# Patient Record
Sex: FEMALE | Race: WHITE | NOT HISPANIC OR LATINO | Employment: UNEMPLOYED | URBAN - METROPOLITAN AREA
[De-identification: names, ages, dates, MRNs, and addresses within clinical notes are randomized per-mention and may not be internally consistent; named-entity substitution may affect disease eponyms.]

---

## 2024-05-10 ENCOUNTER — HOSPITAL ENCOUNTER (INPATIENT)
Facility: HOSPITAL | Age: 74
LOS: 3 days | Discharge: HOME HEALTH | DRG: 271 | End: 2024-05-13
Attending: EMERGENCY MEDICINE | Admitting: INTERNAL MEDICINE
Payer: MEDICARE

## 2024-05-10 DIAGNOSIS — I73.9 CLAUDICATION OF BOTH LOWER EXTREMITIES: ICD-10-CM

## 2024-05-10 DIAGNOSIS — Z87.891 HISTORY OF SMOKING: ICD-10-CM

## 2024-05-10 DIAGNOSIS — I73.9 PERIPHERAL ARTERIAL DISEASE: Primary | ICD-10-CM

## 2024-05-10 DIAGNOSIS — I70.222 CRITICAL LIMB ISCHEMIA OF LEFT LOWER EXTREMITY: ICD-10-CM

## 2024-05-10 DIAGNOSIS — R07.9 CHEST PAIN: ICD-10-CM

## 2024-05-10 LAB
ALBUMIN SERPL-MCNC: 3 G/DL (ref 3.4–4.8)
ALBUMIN/GLOB SERPL: 1.4 RATIO (ref 1.1–2)
ALP SERPL-CCNC: 60 UNIT/L (ref 40–150)
ALT SERPL-CCNC: 13 UNIT/L (ref 0–55)
APTT PPP: 101.4 SECONDS (ref 23.2–33.7)
AST SERPL-CCNC: 18 UNIT/L (ref 5–34)
BASOPHILS # BLD AUTO: 0.02 X10(3)/MCL
BASOPHILS NFR BLD AUTO: 0.2 %
BILIRUB SERPL-MCNC: 0.4 MG/DL
BUN SERPL-MCNC: 18.6 MG/DL (ref 9.8–20.1)
CALCIUM SERPL-MCNC: 8.2 MG/DL (ref 8.4–10.2)
CHLORIDE SERPL-SCNC: 116 MMOL/L (ref 98–107)
CO2 SERPL-SCNC: 20 MMOL/L (ref 23–31)
CREAT SERPL-MCNC: 1.55 MG/DL (ref 0.55–1.02)
EOSINOPHIL # BLD AUTO: 0.12 X10(3)/MCL (ref 0–0.9)
EOSINOPHIL NFR BLD AUTO: 1.4 %
ERYTHROCYTE [DISTWIDTH] IN BLOOD BY AUTOMATED COUNT: 15.4 % (ref 11.5–17)
GFR SERPLBLD CREATININE-BSD FMLA CKD-EPI: 35 ML/MIN/1.73/M2
GLOBULIN SER-MCNC: 2.2 GM/DL (ref 2.4–3.5)
GLUCOSE SERPL-MCNC: 90 MG/DL (ref 82–115)
HCT VFR BLD AUTO: 31.3 % (ref 37–47)
HGB BLD-MCNC: 10.3 G/DL (ref 12–16)
IMM GRANULOCYTES # BLD AUTO: 0.06 X10(3)/MCL (ref 0–0.04)
IMM GRANULOCYTES NFR BLD AUTO: 0.7 %
INR PPP: 1.1
LYMPHOCYTES # BLD AUTO: 3.32 X10(3)/MCL (ref 0.6–4.6)
LYMPHOCYTES NFR BLD AUTO: 37.6 %
MCH RBC QN AUTO: 35.5 PG (ref 27–31)
MCHC RBC AUTO-ENTMCNC: 32.9 G/DL (ref 33–36)
MCV RBC AUTO: 107.9 FL (ref 80–94)
MONOCYTES # BLD AUTO: 0.56 X10(3)/MCL (ref 0.1–1.3)
MONOCYTES NFR BLD AUTO: 6.3 %
NEUTROPHILS # BLD AUTO: 4.74 X10(3)/MCL (ref 2.1–9.2)
NEUTROPHILS NFR BLD AUTO: 53.8 %
NRBC BLD AUTO-RTO: 0 %
PLATELET # BLD AUTO: 209 X10(3)/MCL (ref 130–400)
PMV BLD AUTO: 10.5 FL (ref 7.4–10.4)
POTASSIUM SERPL-SCNC: 3.8 MMOL/L (ref 3.5–5.1)
PROT SERPL-MCNC: 5.2 GM/DL (ref 5.8–7.6)
PROTHROMBIN TIME: 13.7 SECONDS (ref 12.5–14.5)
RBC # BLD AUTO: 2.9 X10(6)/MCL (ref 4.2–5.4)
SODIUM SERPL-SCNC: 144 MMOL/L (ref 136–145)
WBC # SPEC AUTO: 8.82 X10(3)/MCL (ref 4.5–11.5)

## 2024-05-10 PROCEDURE — 11000001 HC ACUTE MED/SURG PRIVATE ROOM

## 2024-05-10 PROCEDURE — 80053 COMPREHEN METABOLIC PANEL: CPT | Performed by: EMERGENCY MEDICINE

## 2024-05-10 PROCEDURE — 99285 EMERGENCY DEPT VISIT HI MDM: CPT | Mod: 25

## 2024-05-10 PROCEDURE — 96365 THER/PROPH/DIAG IV INF INIT: CPT

## 2024-05-10 PROCEDURE — 96366 THER/PROPH/DIAG IV INF ADDON: CPT

## 2024-05-10 PROCEDURE — 85025 COMPLETE CBC W/AUTO DIFF WBC: CPT | Performed by: EMERGENCY MEDICINE

## 2024-05-10 PROCEDURE — 63600175 PHARM REV CODE 636 W HCPCS: Performed by: EMERGENCY MEDICINE

## 2024-05-10 PROCEDURE — 85610 PROTHROMBIN TIME: CPT | Performed by: EMERGENCY MEDICINE

## 2024-05-10 PROCEDURE — 85730 THROMBOPLASTIN TIME PARTIAL: CPT | Performed by: EMERGENCY MEDICINE

## 2024-05-10 RX ORDER — SODIUM CHLORIDE 9 MG/ML
1000 INJECTION, SOLUTION INTRAVENOUS
Status: COMPLETED | OUTPATIENT
Start: 2024-05-10 | End: 2024-05-11

## 2024-05-10 RX ORDER — ONDANSETRON HYDROCHLORIDE 2 MG/ML
4 INJECTION, SOLUTION INTRAVENOUS EVERY 4 HOURS PRN
Status: DISCONTINUED | OUTPATIENT
Start: 2024-05-11 | End: 2024-05-13 | Stop reason: HOSPADM

## 2024-05-10 RX ORDER — HEPARIN SODIUM,PORCINE/D5W 25000/250
0-40 INTRAVENOUS SOLUTION INTRAVENOUS CONTINUOUS
Status: DISCONTINUED | OUTPATIENT
Start: 2024-05-10 | End: 2024-05-11

## 2024-05-10 RX ORDER — ALUMINUM HYDROXIDE, MAGNESIUM HYDROXIDE, AND SIMETHICONE 1200; 120; 1200 MG/30ML; MG/30ML; MG/30ML
30 SUSPENSION ORAL 4 TIMES DAILY PRN
Status: DISCONTINUED | OUTPATIENT
Start: 2024-05-11 | End: 2024-05-13 | Stop reason: HOSPADM

## 2024-05-10 RX ORDER — NALOXONE HCL 0.4 MG/ML
0.02 VIAL (ML) INJECTION
Status: DISCONTINUED | OUTPATIENT
Start: 2024-05-11 | End: 2024-05-13 | Stop reason: HOSPADM

## 2024-05-10 RX ORDER — PROCHLORPERAZINE EDISYLATE 5 MG/ML
5 INJECTION INTRAMUSCULAR; INTRAVENOUS EVERY 6 HOURS PRN
Status: DISCONTINUED | OUTPATIENT
Start: 2024-05-11 | End: 2024-05-13 | Stop reason: HOSPADM

## 2024-05-10 RX ORDER — POLYETHYLENE GLYCOL 3350 17 G/17G
17 POWDER, FOR SOLUTION ORAL 2 TIMES DAILY PRN
Status: DISCONTINUED | OUTPATIENT
Start: 2024-05-11 | End: 2024-05-13 | Stop reason: HOSPADM

## 2024-05-10 RX ORDER — TALC
6 POWDER (GRAM) TOPICAL NIGHTLY PRN
Status: DISCONTINUED | OUTPATIENT
Start: 2024-05-11 | End: 2024-05-13 | Stop reason: HOSPADM

## 2024-05-10 RX ORDER — ACETAMINOPHEN 325 MG/1
650 TABLET ORAL EVERY 6 HOURS PRN
Status: DISCONTINUED | OUTPATIENT
Start: 2024-05-11 | End: 2024-05-13 | Stop reason: HOSPADM

## 2024-05-10 RX ORDER — SIMETHICONE 80 MG
1 TABLET,CHEWABLE ORAL 4 TIMES DAILY PRN
Status: DISCONTINUED | OUTPATIENT
Start: 2024-05-11 | End: 2024-05-13 | Stop reason: HOSPADM

## 2024-05-10 RX ADMIN — HEPARIN SODIUM 18 UNITS/KG/HR: 10000 INJECTION, SOLUTION INTRAVENOUS at 09:05

## 2024-05-10 NOTE — Clinical Note
The DP pulses were non-palpable bilaterally. The PT pulses were detected with doppler bilaterally. The radial pulses were +2 bilaterally.

## 2024-05-11 PROBLEM — I73.9 PERIPHERAL ARTERIAL DISEASE: Status: ACTIVE | Noted: 2024-05-11

## 2024-05-11 PROBLEM — I70.222 CRITICAL LIMB ISCHEMIA OF LEFT LOWER EXTREMITY: Status: ACTIVE | Noted: 2024-05-11

## 2024-05-11 LAB
ALBUMIN SERPL-MCNC: 2.8 G/DL (ref 3.4–4.8)
ALBUMIN/GLOB SERPL: 1.1 RATIO (ref 1.1–2)
ALP SERPL-CCNC: 61 UNIT/L (ref 40–150)
ALT SERPL-CCNC: 13 UNIT/L (ref 0–55)
APTT PPP: 64.8 SECONDS (ref 23.2–33.7)
AST SERPL-CCNC: 14 UNIT/L (ref 5–34)
BASOPHILS # BLD AUTO: 0.03 X10(3)/MCL
BASOPHILS NFR BLD AUTO: 0.4 %
BILIRUB SERPL-MCNC: 0.3 MG/DL
BUN SERPL-MCNC: 20.1 MG/DL (ref 9.8–20.1)
CALCIUM SERPL-MCNC: 8.3 MG/DL (ref 8.4–10.2)
CHLORIDE SERPL-SCNC: 116 MMOL/L (ref 98–107)
CO2 SERPL-SCNC: 21 MMOL/L (ref 23–31)
CREAT SERPL-MCNC: 1.51 MG/DL (ref 0.55–1.02)
EOSINOPHIL # BLD AUTO: 0.12 X10(3)/MCL (ref 0–0.9)
EOSINOPHIL NFR BLD AUTO: 1.5 %
ERYTHROCYTE [DISTWIDTH] IN BLOOD BY AUTOMATED COUNT: 15.4 % (ref 11.5–17)
GFR SERPLBLD CREATININE-BSD FMLA CKD-EPI: 36 ML/MIN/1.73/M2
GLOBULIN SER-MCNC: 2.6 GM/DL (ref 2.4–3.5)
GLUCOSE SERPL-MCNC: 132 MG/DL (ref 82–115)
HCT VFR BLD AUTO: 33.8 % (ref 37–47)
HGB BLD-MCNC: 11.1 G/DL (ref 12–16)
IMM GRANULOCYTES # BLD AUTO: 0.07 X10(3)/MCL (ref 0–0.04)
IMM GRANULOCYTES NFR BLD AUTO: 0.9 %
LYMPHOCYTES # BLD AUTO: 3.1 X10(3)/MCL (ref 0.6–4.6)
LYMPHOCYTES NFR BLD AUTO: 38.8 %
MAGNESIUM SERPL-MCNC: 1.7 MG/DL (ref 1.6–2.6)
MCH RBC QN AUTO: 35.5 PG (ref 27–31)
MCHC RBC AUTO-ENTMCNC: 32.8 G/DL (ref 33–36)
MCV RBC AUTO: 108 FL (ref 80–94)
MONOCYTES # BLD AUTO: 0.56 X10(3)/MCL (ref 0.1–1.3)
MONOCYTES NFR BLD AUTO: 7 %
NEUTROPHILS # BLD AUTO: 4.1 X10(3)/MCL (ref 2.1–9.2)
NEUTROPHILS NFR BLD AUTO: 51.4 %
NRBC BLD AUTO-RTO: 0 %
PHOSPHATE SERPL-MCNC: 3.5 MG/DL (ref 2.3–4.7)
PLATELET # BLD AUTO: 191 X10(3)/MCL (ref 130–400)
PMV BLD AUTO: 10.8 FL (ref 7.4–10.4)
POTASSIUM SERPL-SCNC: 4 MMOL/L (ref 3.5–5.1)
PROT SERPL-MCNC: 5.4 GM/DL (ref 5.8–7.6)
RBC # BLD AUTO: 3.13 X10(6)/MCL (ref 4.2–5.4)
SODIUM SERPL-SCNC: 145 MMOL/L (ref 136–145)
WBC # SPEC AUTO: 7.98 X10(3)/MCL (ref 4.5–11.5)

## 2024-05-11 PROCEDURE — C1714 CATH, TRANS ATHERECTOMY, DIR: HCPCS | Performed by: INTERNAL MEDICINE

## 2024-05-11 PROCEDURE — 21400001 HC TELEMETRY ROOM

## 2024-05-11 PROCEDURE — 80053 COMPREHEN METABOLIC PANEL: CPT | Performed by: NURSE PRACTITIONER

## 2024-05-11 PROCEDURE — 37252 INTRVASC US NONCORONARY 1ST: CPT | Performed by: INTERNAL MEDICINE

## 2024-05-11 PROCEDURE — 85025 COMPLETE CBC W/AUTO DIFF WBC: CPT | Performed by: EMERGENCY MEDICINE

## 2024-05-11 PROCEDURE — 04CL3ZZ EXTIRPATION OF MATTER FROM LEFT FEMORAL ARTERY, PERCUTANEOUS APPROACH: ICD-10-PCS | Performed by: INTERNAL MEDICINE

## 2024-05-11 PROCEDURE — B44GZZ3 ULTRASONOGRAPHY OF LEFT LOWER EXTREMITY ARTERIES, INTRAVASCULAR: ICD-10-PCS | Performed by: INTERNAL MEDICINE

## 2024-05-11 PROCEDURE — 27201423 OPTIME MED/SURG SUP & DEVICES STERILE SUPPLY: Performed by: INTERNAL MEDICINE

## 2024-05-11 PROCEDURE — 85730 THROMBOPLASTIN TIME PARTIAL: CPT | Performed by: EMERGENCY MEDICINE

## 2024-05-11 PROCEDURE — 84100 ASSAY OF PHOSPHORUS: CPT | Performed by: NURSE PRACTITIONER

## 2024-05-11 PROCEDURE — 37227 HC FEM/POPL REVASC STNT & ATHER: CPT | Mod: LT | Performed by: INTERNAL MEDICINE

## 2024-05-11 PROCEDURE — C1884 EMBOLIZATION PROTECT SYST: HCPCS | Performed by: INTERNAL MEDICINE

## 2024-05-11 PROCEDURE — 75716 ARTERY X-RAYS ARMS/LEGS: CPT | Mod: 59 | Performed by: INTERNAL MEDICINE

## 2024-05-11 PROCEDURE — C1769 GUIDE WIRE: HCPCS | Performed by: INTERNAL MEDICINE

## 2024-05-11 PROCEDURE — 99153 MOD SED SAME PHYS/QHP EA: CPT | Performed by: INTERNAL MEDICINE

## 2024-05-11 PROCEDURE — B41DZZZ FLUOROSCOPY OF AORTA AND BILATERAL LOWER EXTREMITY ARTERIES: ICD-10-PCS | Performed by: INTERNAL MEDICINE

## 2024-05-11 PROCEDURE — C1760 CLOSURE DEV, VASC: HCPCS | Performed by: INTERNAL MEDICINE

## 2024-05-11 PROCEDURE — 63600175 PHARM REV CODE 636 W HCPCS: Performed by: NURSE PRACTITIONER

## 2024-05-11 PROCEDURE — C1876 STENT, NON-COA/NON-COV W/DEL: HCPCS | Performed by: INTERNAL MEDICINE

## 2024-05-11 PROCEDURE — 25000003 PHARM REV CODE 250: Performed by: INTERNAL MEDICINE

## 2024-05-11 PROCEDURE — C1894 INTRO/SHEATH, NON-LASER: HCPCS | Performed by: INTERNAL MEDICINE

## 2024-05-11 PROCEDURE — 99152 MOD SED SAME PHYS/QHP 5/>YRS: CPT | Performed by: INTERNAL MEDICINE

## 2024-05-11 PROCEDURE — 25000003 PHARM REV CODE 250: Performed by: EMERGENCY MEDICINE

## 2024-05-11 PROCEDURE — 83735 ASSAY OF MAGNESIUM: CPT | Performed by: NURSE PRACTITIONER

## 2024-05-11 PROCEDURE — C1887 CATHETER, GUIDING: HCPCS | Performed by: INTERNAL MEDICINE

## 2024-05-11 PROCEDURE — C1725 CATH, TRANSLUMIN NON-LASER: HCPCS | Performed by: INTERNAL MEDICINE

## 2024-05-11 PROCEDURE — 63600175 PHARM REV CODE 636 W HCPCS: Performed by: INTERNAL MEDICINE

## 2024-05-11 PROCEDURE — 25000003 PHARM REV CODE 250: Performed by: NURSE PRACTITIONER

## 2024-05-11 PROCEDURE — 047L3D1 DILATION OF LEFT FEMORAL ARTERY WITH INTRALUMINAL DEVICE, USING DRUG-COATED BALLOON, PERCUTANEOUS APPROACH: ICD-10-PCS | Performed by: INTERNAL MEDICINE

## 2024-05-11 DEVICE — ANGIO-SEAL VIP VASCULAR CLOSURE DEVICE
Type: IMPLANTABLE DEVICE | Site: ARTERIAL | Status: FUNCTIONAL
Brand: ANGIO-SEAL

## 2024-05-11 DEVICE — SELF-EXPANDING STENT SYSTEM
Type: IMPLANTABLE DEVICE | Site: ARTERIAL | Status: FUNCTIONAL
Brand: INNOVA™ VASCULAR

## 2024-05-11 RX ORDER — CLOPIDOGREL BISULFATE 300 MG/1
300 TABLET, FILM COATED ORAL ONCE
Qty: 1 TABLET | Refills: 0 | Status: COMPLETED | OUTPATIENT
Start: 2024-05-11 | End: 2024-05-11

## 2024-05-11 RX ORDER — SODIUM CHLORIDE, SODIUM LACTATE, POTASSIUM CHLORIDE, CALCIUM CHLORIDE 600; 310; 30; 20 MG/100ML; MG/100ML; MG/100ML; MG/100ML
INJECTION, SOLUTION INTRAVENOUS CONTINUOUS
Status: DISCONTINUED | OUTPATIENT
Start: 2024-05-11 | End: 2024-05-13

## 2024-05-11 RX ORDER — LIDOCAINE HYDROCHLORIDE 10 MG/ML
INJECTION INFILTRATION; PERINEURAL
Status: DISCONTINUED | OUTPATIENT
Start: 2024-05-11 | End: 2024-05-11 | Stop reason: HOSPADM

## 2024-05-11 RX ORDER — CLOPIDOGREL BISULFATE 75 MG/1
75 TABLET ORAL DAILY
Status: DISCONTINUED | OUTPATIENT
Start: 2024-05-12 | End: 2024-05-13 | Stop reason: HOSPADM

## 2024-05-11 RX ORDER — HEPARIN SODIUM 1000 [USP'U]/ML
INJECTION, SOLUTION INTRAVENOUS; SUBCUTANEOUS
Status: DISCONTINUED | OUTPATIENT
Start: 2024-05-11 | End: 2024-05-11 | Stop reason: HOSPADM

## 2024-05-11 RX ORDER — HYDRALAZINE HYDROCHLORIDE 20 MG/ML
INJECTION INTRAMUSCULAR; INTRAVENOUS
Status: DISCONTINUED | OUTPATIENT
Start: 2024-05-11 | End: 2024-05-11 | Stop reason: HOSPADM

## 2024-05-11 RX ORDER — ASPIRIN 325 MG
325 TABLET, DELAYED RELEASE (ENTERIC COATED) ORAL ONCE
Status: COMPLETED | OUTPATIENT
Start: 2024-05-11 | End: 2024-05-11

## 2024-05-11 RX ORDER — MORPHINE SULFATE 4 MG/ML
2 INJECTION, SOLUTION INTRAMUSCULAR; INTRAVENOUS EVERY 4 HOURS PRN
Status: DISCONTINUED | OUTPATIENT
Start: 2024-05-11 | End: 2024-05-13 | Stop reason: HOSPADM

## 2024-05-11 RX ORDER — SODIUM CHLORIDE 9 MG/ML
INJECTION, SOLUTION INTRAVENOUS CONTINUOUS
Status: ACTIVE | OUTPATIENT
Start: 2024-05-11 | End: 2024-05-11

## 2024-05-11 RX ORDER — HYDROCODONE BITARTRATE AND ACETAMINOPHEN 5; 325 MG/1; MG/1
1 TABLET ORAL EVERY 4 HOURS PRN
Status: DISCONTINUED | OUTPATIENT
Start: 2024-05-11 | End: 2024-05-13 | Stop reason: HOSPADM

## 2024-05-11 RX ORDER — VANCOMYCIN HYDROCHLORIDE 1 G/20ML
INJECTION, POWDER, LYOPHILIZED, FOR SOLUTION INTRAVENOUS
Status: DISCONTINUED | OUTPATIENT
Start: 2024-05-11 | End: 2024-05-11 | Stop reason: HOSPADM

## 2024-05-11 RX ORDER — NITROGLYCERIN 20 MG/100ML
INJECTION INTRAVENOUS
Status: DISCONTINUED | OUTPATIENT
Start: 2024-05-11 | End: 2024-05-11 | Stop reason: HOSPADM

## 2024-05-11 RX ORDER — FENTANYL CITRATE 50 UG/ML
INJECTION, SOLUTION INTRAMUSCULAR; INTRAVENOUS
Status: DISCONTINUED | OUTPATIENT
Start: 2024-05-11 | End: 2024-05-11 | Stop reason: HOSPADM

## 2024-05-11 RX ORDER — HYDRALAZINE HYDROCHLORIDE 20 MG/ML
10 INJECTION INTRAMUSCULAR; INTRAVENOUS EVERY 4 HOURS PRN
Status: DISCONTINUED | OUTPATIENT
Start: 2024-05-11 | End: 2024-05-13 | Stop reason: HOSPADM

## 2024-05-11 RX ORDER — MIDAZOLAM HYDROCHLORIDE 1 MG/ML
INJECTION INTRAMUSCULAR; INTRAVENOUS
Status: DISCONTINUED | OUTPATIENT
Start: 2024-05-11 | End: 2024-05-11 | Stop reason: HOSPADM

## 2024-05-11 RX ORDER — ASPIRIN 81 MG/1
81 TABLET ORAL DAILY
Status: DISCONTINUED | OUTPATIENT
Start: 2024-05-12 | End: 2024-05-13 | Stop reason: HOSPADM

## 2024-05-11 RX ADMIN — CLOPIDOGREL BISULFATE 300 MG: 300 TABLET, FILM COATED ORAL at 11:05

## 2024-05-11 RX ADMIN — SODIUM CHLORIDE: 9 INJECTION, SOLUTION INTRAVENOUS at 06:05

## 2024-05-11 RX ADMIN — ASPIRIN 325 MG: 325 TABLET, COATED ORAL at 10:05

## 2024-05-11 RX ADMIN — HYDROCODONE BITARTRATE AND ACETAMINOPHEN 1 TABLET: 5; 325 TABLET ORAL at 03:05

## 2024-05-11 RX ADMIN — SODIUM CHLORIDE, POTASSIUM CHLORIDE, SODIUM LACTATE AND CALCIUM CHLORIDE: 600; 310; 30; 20 INJECTION, SOLUTION INTRAVENOUS at 05:05

## 2024-05-11 RX ADMIN — SODIUM CHLORIDE 1000 ML: 9 INJECTION, SOLUTION INTRAVENOUS at 12:05

## 2024-05-11 NOTE — CONSULTS
Inpatient consult to Cardiology  Consult performed by: Ananya Kelley FNP  Consult ordered by: Anthony Live MD        [facility] - [facility] Tele[facility]    Cardiology  Consult Note    Patient Name: Zita Read  MRN: [id number]  Admission Date: 5/10/2024  Hospital Length of Stay: 1 days  Code Status: Full Code   Attending Provider: James Black MD   Consulting Provider: LINDA Villeda  Primary Care Physician: Gilberto Flores III, MD  Principal Problem:Peripheral arterial disease    Patient information was obtained from patient, past medical records, and ER records.     Subjective:     Chief Complaint/Reason for Consult: PAD    HPI:   Ms. Read is a 74y/o female, unknown to [facility], who was transferred from [facility] for vascular services due to BLE pain L>R. Feet were cool with discolored toes. CTA abdomen/pelvis with runoff obtained revealing occlusion of most of Left femoral artery with reconstitution from profunda collateral vessels at the distal femoral artery into the popliteal artery with diffuse narrowing of remaining distal L femoral and popliteal arteries. 3V runoff to mid calf with suboptimal opacification of L AT artery and PT artery at the level of the ankle. RLE revealing contrast opacifiation of R AT, PT and peroneal arteries up to the mid calf with no opacification seen below that level.  She has been placed on a heparin drip and admitted to hospital medicine. [facility] consulted due to PAD  ** BLE have cool feet. + numbness/tingling with ischemic changes to left big toe and left pinkie. + monophasic doppler PT pulses BLE and DP pulse to R foot. No pulse noted to  L DP. Appears to be chronic limb ischemia         PMH: HLD, COPD, PTSD, sarcomatoid carcinoma left lung, left foot bone spur removal 02/2024  PSH: hysterectomy with BSO, cholecystectomy, vertebroplasty, colonoscopy/EGD with dilatation, L lung lobectomy  Family History: father- prostate cancer;  mother- HTN,Renal failure, DM2  Social History: current smoker    Previous Cardiac Diagnostics:   None on file    No past medical history on file.  No past surgical history on file.  Review of patient's allergies indicates:   Allergen Reactions    Levaquin [levofloxacin]     Lithium analogues     Pcn [penicillins]      No current facility-administered medications on file prior to encounter.     No current outpatient medications on file prior to encounter.     Family History    None       Tobacco Use    Smoking status: Not on file    Smokeless tobacco: Not on file   Substance and Sexual Activity    Alcohol use: Not on file    Drug use: Not on file    Sexual activity: Not on file       Review of Systems   Constitutional: Negative.    Respiratory: Negative.     Cardiovascular: Negative.    Gastrointestinal: Negative.    Musculoskeletal:         Numbness/tingling to bilateral feet, worse to left foot   Skin:  Positive for color change.       Objective:     Vital Signs (Most Recent):  Temp: 97.8 °F (36.6 °C) (05/11/24 0424)  Pulse: 71 (05/11/24 0424)  Resp: (!) 26 (05/10/24 2259)  BP: 129/71 (05/11/24 0424)  SpO2: 99 % (05/11/24 0424) Vital Signs (24h Range):  Temp:  [97.8 °F (36.6 °C)-98.2 °F (36.8 °C)] 97.8 °F (36.6 °C)  Pulse:  [55-71] 71  Resp:  [14-26] 26  SpO2:  [99 %-100 %] 99 %  BP: (129-155)/(67-83) 129/71   Weight: 63.9 kg (140 lb 14 oz)  Body mass index is 22.06 kg/m².  SpO2: 99 %     No intake or output data in the 24 hours ending 05/11/24 0756  Lines/Drains/Airways       Peripheral Intravenous Line  Duration                  Peripheral IV - Single Lumen 20 G Anterior;Proximal;Right Forearm -- days         Peripheral IV - Single Lumen 20 G Right Antecubital -- days                  Significant Labs:  Recent Results (from the past 72 hour(s))   Comprehensive metabolic panel    Collection Time: 05/10/24  9:52 PM   Result Value Ref Range    Sodium 144 136 - 145 mmol/L    Potassium 3.8 3.5 - 5.1 mmol/L     Chloride 116 (H) 98 - 107 mmol/L    CO2 20 (L) 23 - 31 mmol/L    Glucose 90 82 - 115 mg/dL    Blood Urea Nitrogen 18.6 9.8 - 20.1 mg/dL    Creatinine 1.55 (H) 0.55 - 1.02 mg/dL    Calcium 8.2 (L) 8.4 - 10.2 mg/dL    Protein Total 5.2 (L) 5.8 - 7.6 gm/dL    Albumin 3.0 (L) 3.4 - 4.8 g/dL    Globulin 2.2 (L) 2.4 - 3.5 gm/dL    Albumin/Globulin Ratio 1.4 1.1 - 2.0 ratio    Bilirubin Total 0.4 <=1.5 mg/dL    ALP 60 40 - 150 unit/L    ALT 13 0 - 55 unit/L    AST 18 5 - 34 unit/L    eGFR 35 mL/min/1.73/m2   APTT    Collection Time: 05/10/24  9:52 PM   Result Value Ref Range    .4 (H) 23.2 - 33.7 seconds   Protime-INR    Collection Time: 05/10/24  9:52 PM   Result Value Ref Range    PT 13.7 12.5 - 14.5 seconds    INR 1.1 <=1.3   CBC with Differential    Collection Time: 05/10/24  9:52 PM   Result Value Ref Range    WBC 8.82 4.50 - 11.50 x10(3)/mcL    RBC 2.90 (L) 4.20 - 5.40 x10(6)/mcL    Hgb 10.3 (L) 12.0 - 16.0 g/dL    Hct 31.3 (L) 37.0 - 47.0 %    .9 (H) 80.0 - 94.0 fL    MCH 35.5 (H) 27.0 - 31.0 pg    MCHC 32.9 (L) 33.0 - 36.0 g/dL    RDW 15.4 11.5 - 17.0 %    Platelet 209 130 - 400 x10(3)/mcL    MPV 10.5 (H) 7.4 - 10.4 fL    Neut % 53.8 %    Lymph % 37.6 %    Mono % 6.3 %    Eos % 1.4 %    Basophil % 0.2 %    Lymph # 3.32 0.6 - 4.6 x10(3)/mcL    Neut # 4.74 2.1 - 9.2 x10(3)/mcL    Mono # 0.56 0.1 - 1.3 x10(3)/mcL    Eos # 0.12 0 - 0.9 x10(3)/mcL    Baso # 0.02 <=0.2 x10(3)/mcL    IG# 0.06 (H) 0 - 0.04 x10(3)/mcL    IG% 0.7 %    NRBC% 0.0 %   APTT    Collection Time: 05/11/24  4:05 AM   Result Value Ref Range    PTT 64.8 (H) 23.2 - 33.7 seconds   Comprehensive Metabolic Panel (CMP)    Collection Time: 05/11/24  4:05 AM   Result Value Ref Range    Sodium 145 136 - 145 mmol/L    Potassium 4.0 3.5 - 5.1 mmol/L    Chloride 116 (H) 98 - 107 mmol/L    CO2 21 (L) 23 - 31 mmol/L    Glucose 132 (H) 82 - 115 mg/dL    Blood Urea Nitrogen 20.1 9.8 - 20.1 mg/dL    Creatinine 1.51 (H) 0.55 - 1.02 mg/dL    Calcium 8.3  (L) 8.4 - 10.2 mg/dL    Protein Total 5.4 (L) 5.8 - 7.6 gm/dL    Albumin 2.8 (L) 3.4 - 4.8 g/dL    Globulin 2.6 2.4 - 3.5 gm/dL    Albumin/Globulin Ratio 1.1 1.1 - 2.0 ratio    Bilirubin Total 0.3 <=1.5 mg/dL    ALP 61 40 - 150 unit/L    ALT 13 0 - 55 unit/L    AST 14 5 - 34 unit/L    eGFR 36 mL/min/1.73/m2   Magnesium    Collection Time: 05/11/24  4:05 AM   Result Value Ref Range    Magnesium Level 1.70 1.60 - 2.60 mg/dL   Phosphorus    Collection Time: 05/11/24  4:05 AM   Result Value Ref Range    Phosphorus Level 3.5 2.3 - 4.7 mg/dL   CBC with Differential    Collection Time: 05/11/24  4:05 AM   Result Value Ref Range    WBC 7.98 4.50 - 11.50 x10(3)/mcL    RBC 3.13 (L) 4.20 - 5.40 x10(6)/mcL    Hgb 11.1 (L) 12.0 - 16.0 g/dL    Hct 33.8 (L) 37.0 - 47.0 %    .0 (H) 80.0 - 94.0 fL    MCH 35.5 (H) 27.0 - 31.0 pg    MCHC 32.8 (L) 33.0 - 36.0 g/dL    RDW 15.4 11.5 - 17.0 %    Platelet 191 130 - 400 x10(3)/mcL    MPV 10.8 (H) 7.4 - 10.4 fL    Neut % 51.4 %    Lymph % 38.8 %    Mono % 7.0 %    Eos % 1.5 %    Basophil % 0.4 %    Lymph # 3.10 0.6 - 4.6 x10(3)/mcL    Neut # 4.10 2.1 - 9.2 x10(3)/mcL    Mono # 0.56 0.1 - 1.3 x10(3)/mcL    Eos # 0.12 0 - 0.9 x10(3)/mcL    Baso # 0.03 <=0.2 x10(3)/mcL    IG# 0.07 (H) 0 - 0.04 x10(3)/mcL    IG% 0.9 %    NRBC% 0.0 %     Significant Imaging:  Imaging Results    None       Physical Exam  Cardiovascular:      Rate and Rhythm: Normal rate and regular rhythm.      Pulses: Normal pulses.      Heart sounds: Normal heart sounds.   Pulmonary:      Effort: Pulmonary effort is normal.      Breath sounds: Normal breath sounds.   Abdominal:      Palpations: Abdomen is soft.   Musculoskeletal:         General: Normal range of motion.   Skin:     General: Skin is warm.      Comments: Cool feet bilaterally  Ischemic changes to left big toe and left pinkie  Scar to left proximal foot due to bone spur removal   Neurological:      General: No focal deficit present.      Mental Status: She  is alert.   Psychiatric:         Mood and Affect: Mood normal.           Home Medications:   No current facility-administered medications on file prior to encounter.     No current outpatient medications on file prior to encounter.     Current Inpatient Medications:    Current Facility-Administered Medications:     acetaminophen tablet 650 mg, 650 mg, Oral, Q6H PRN, Valeri Diana, AGACNP-BC    aluminum-magnesium hydroxide-simethicone 200-200-20 mg/5 mL suspension 30 mL, 30 mL, Oral, QID PRN, Valeri Diana, AGACNP-BC    heparin 25,000 units in dextrose 5% (100 units/ml) IV bolus from bag HIGH INTENSITY nomogram - [facility], 60 Units/kg (Adjusted), Intravenous, PRN, Anthony Live MD    heparin 25,000 units in dextrose 5% (100 units/ml) IV bolus from bag HIGH INTENSITY nomogram - [facility], 30 Units/kg (Adjusted), Intravenous, PRN, Anthony Live MD    heparin 25,000 units in dextrose 5% 250 mL (100 units/mL) infusion HIGH INTENSITY nomogram - [facility], 0-40 Units/kg/hr (Adjusted), Intravenous, Continuous, Anthony Live MD, Last Rate: 11.3 mL/hr at 05/10/24 2129, 18 Units/kg/hr at 05/10/24 2129    lactated ringers infusion, , Intravenous, Continuous, Valeri Diana, AGACNP-BC, Last Rate: 75 mL/hr at 05/11/24 0533, New Bag at 05/11/24 0533    melatonin tablet 6 mg, 6 mg, Oral, Nightly PRN, Valeri Diana, AGACNP-BC    naloxone 0.4 mg/mL injection 0.02 mg, 0.02 mg, Intravenous, PRN, Valeri Diana, AGACNP-BC    ondansetron injection 4 mg, 4 mg, Intravenous, Q4H PRN, Valeri Diana, AGACNP-BC    polyethylene glycol packet 17 g, 17 g, Oral, BID PRN, Valeri Diana, AGACNP-BC    prochlorperazine injection Soln 5 mg, 5 mg, Intravenous, Q6H PRN, Valeri Diana, AGACNP-BC    simethicone chewable tablet 80 mg, 1 tablet, Oral, QID PRN, Valeri Diana, LB-BC  VTE Risk Mitigation (From admission, onward)           Ordered     IP VTE HIGH RISK PATIENT  Once         05/10/24 2344     Place sequential  compression device  Until discontinued         05/10/24 2344     Reason for No Pharmacological VTE Prophylaxis  Once        Question:  Reasons:  Answer:  IV Heparin w/in 24 hrs. Pre or Post-Op    05/10/24 2344     heparin 25,000 units in dextrose 5% (100 units/ml) IV bolus from bag HIGH INTENSITY nomogram - [facility]  As needed (PRN)        Question:  Heparin Infusion Adjustment (DO NOT MODIFY ANSWER)  Answer:  [email or web address]\Images\Pharmacy\HeparinInfusions\heparin HIGH INTENSITY nomogram for [facility] [id number].pdf    05/10/24 2121     heparin 25,000 units in dextrose 5% (100 units/ml) IV bolus from bag HIGH INTENSITY nomogram - [facility]  As needed (PRN)        Question:  Heparin Infusion Adjustment (DO NOT MODIFY ANSWER)  Answer:  [email or web address]\Images\Pharmacy\HeparinInfusions\heparin HIGH INTENSITY nomogram for [facility] [id number].pdf    05/10/24 2121     heparin 25,000 units in dextrose 5% 250 mL (100 units/mL) infusion HIGH INTENSITY nomogram - [facility]  Continuous        Question:  Begin at (units/kg/hr)  Answer:  18    05/10/24 2121                  Assessment:   CLI  -CTA abdomen/pelvis and BLE 5.10.24: occlusion of most of Left femoral artery with reconstitution from profunda collateral vessels at the distal femoral artery into the popliteal artery with diffuse narrowing of remaining distal L femoral and popliteal arteries. 3V runoff to mid calf with suboptimal opacification of L AT artery and PT artery at the level of the ankle. RLE revealing contrast opacifiation of R AT, PT and peroneal arteries up to the mid calf with no opacification seen below that level.   COPD  HLD  PTSD  DORIAN  Hx sarcomatoid carcinoma of left long  Tobacco dependence  Hx bone spur removal left foot    Plan:   DC heparin drip  Plan peripheral angiogram LLE today. R/B/A discussed with patient and she is amenable to proceeding. Consent obtained and placed on chart  Start aspirin, plavix, and statin  Pain management per primary  Creatinine 1.5. IVF  infusing      Thank you for your consult.     Ananya Kelley, LINDA  Cardiology  [facility] - [facility] Tele[facility]  05/11/2024     I agree with the findings of the complexity of problems addressed and take responsibility for the plan's risks and complications. I approved the plan documented by Ananya Kelley NP. Dr. Giraldo to do angio case discussed

## 2024-05-11 NOTE — ED PROVIDER NOTES
Encounter Date: 5/10/2024       History     Chief Complaint   Patient presents with    Transfer     Transfer from [facility] for vascular sx w/ complete occlusion of left femoral artery     Patient is a 73-year-old female who was transferred from a outlying facility secondary to peripheral arterial disease.  CTA was done of the lower extremities which apparently showed a complete occlusion of the left femoral artery with diffuse narrowing and partial occlusion of the right popliteal artery.  Patient is a smoker.  Patient denies any coronary artery disease.  Patient was given a heparin bolus and started on a heparin infusion prior to transfer.      Labs done transferring facility, white blood cell count 9400, H&H 10.9 and 33.7, platelet count 345265, sodium 145, potassium 3.5, BUN 23, creatinine 1.82.  Glucose was 86, LFTs are within normal.  PTT 11.2, INR 1.0, APTT 23 seconds    CTA of the abdomen pelvis and bilateral lower extremities with IV contrast was performed at the transferring facility:  Lower extremity vasculature, the right common femoral and femoral arteries appear patent.  Portions of the right popliteal artery are obscured by right knee arthroplasty.  There is a contrast opacification of the proximal right anterior tibial, posterior tibial and peroneal arteries.  These are not seen past the mid calf region concerning for occlusion seen on imaging      Review of patient's allergies indicates:   Allergen Reactions    Levaquin [levofloxacin]     Lithium analogues     Pcn [penicillins]      No past medical history on file.  No past surgical history on file.  No family history on file.     Review of Systems   Constitutional: Negative.    Respiratory: Negative.     Cardiovascular: Negative.    Gastrointestinal: Negative.    Musculoskeletal:  Positive for myalgias. Negative for back pain, joint swelling and neck pain.   Skin:  Positive for color change.   Neurological: Negative.     Psychiatric/Behavioral: Negative.         Physical Exam     Initial Vitals [05/10/24 2049]   BP Pulse Resp Temp SpO2   136/67 62 14 98.2 °F (36.8 °C) 99 %      MAP       --         Physical Exam    Nursing note and vitals reviewed.  Constitutional: She appears well-developed and well-nourished.   HENT:   Head: Normocephalic.   Neck: Neck supple.   Normal range of motion.  Cardiovascular:  Normal rate, regular rhythm and normal heart sounds.           Pulmonary/Chest: Breath sounds normal.   Abdominal: Abdomen is soft. She exhibits no distension. There is no abdominal tenderness. There is no guarding.   Musculoskeletal:      Cervical back: Normal range of motion and neck supple.      Comments: Patient can move both feet without difficulty and move all digits of the feet.  Sensory is intact to the foot bilaterally.  The feet are both cool to touch but not called.  We could not get a dopplerable dorsalis pedis or posterior tibialis pulse in either foot.  Patient has easily dopplerable popliteal pulses bilaterally.     Neurological: She is alert and oriented to person, place, and time. She has normal strength.   Psychiatric: She has a normal mood and affect. Thought content normal.         ED Course   Critical Care    Date/Time: 5/10/2024 11:11 PM    Performed by: Anthony Live MD  Authorized by: James Black MD  Direct patient critical care time: 25 minutes  Additional history critical care time: 10 minutes  Ordering / reviewing critical care time: 10 minutes  Documentation critical care time: 15 minutes  Consulting other physicians critical care time: 10 minutes  Total critical care time (exclusive of procedural time) : 70 minutes  Critical care time was exclusive of separately billable procedures and treating other patients and teaching time.  Critical care was necessary to treat or prevent imminent or life-threatening deterioration of the following conditions: circulatory failure.  Critical care  was time spent personally by me on the following activities: development of treatment plan with patient or surrogate, discussions with consultants, discussions with primary provider, interpretation of cardiac output measurements, evaluation of patient's response to treatment, examination of patient, obtaining history from patient or surrogate, ordering and performing treatments and interventions, ordering and review of laboratory studies, pulse oximetry, ordering and review of radiographic studies, re-evaluation of patient's condition and review of old charts.        Labs Reviewed   COMPREHENSIVE METABOLIC PANEL - Abnormal; Notable for the following components:       Result Value    Chloride 116 (*)     CO2 20 (*)     Creatinine 1.55 (*)     Calcium 8.2 (*)     Protein Total 5.2 (*)     Albumin 3.0 (*)     Globulin 2.2 (*)     All other components within normal limits   APTT - Abnormal; Notable for the following components:    .4 (*)     All other components within normal limits   CBC WITH DIFFERENTIAL - Abnormal; Notable for the following components:    RBC 2.90 (*)     Hgb 10.3 (*)     Hct 31.3 (*)     .9 (*)     MCH 35.5 (*)     MCHC 32.9 (*)     MPV 10.5 (*)     IG# 0.06 (*)     All other components within normal limits   PROTIME-INR - Normal   CBC W/ AUTO DIFFERENTIAL    Narrative:     The following orders were created for panel order CBC auto differential.  Procedure                               Abnormality         Status                     ---------                               -----------         ------                     CBC with Differential[[id number]]       Abnormal            Final result                 Please view results for these tests on the individual orders.          Imaging Results    None          Medications   heparin 25,000 units in dextrose 5% 250 mL (100 units/mL) infusion HIGH INTENSITY nomogram - [facility] (18 Units/kg/hr × 62.5 kg (Adjusted) Intravenous New Bag 5/10/24 2004)    heparin 25,000 units in dextrose 5% (100 units/ml) IV bolus from bag HIGH INTENSITY nomogram - [facility] (has no administration in time range)   heparin 25,000 units in dextrose 5% (100 units/ml) IV bolus from bag HIGH INTENSITY nomogram - [facility] (has no administration in time range)   0.9%  NaCl infusion (has no administration in time range)     Medical Decision Making  Differential diagnosis: Claudication, peripheral arterial disease, muscle spasm, history of smoking    Amount and/or Complexity of Data Reviewed  Labs: ordered.     Details: Labs that were done at the transferring facility were reviewed  Discussion of management or test interpretation with external provider(s): CTA of the lower extremities with contrast was performed the transferring facility.  Patient has peripheral arterial disease to both lower extremities.  Here in the ER, sensation is intact to both feet and patient can move all digits of both feet.  Her feet are cool to touch, not cold to touch.  Patient arrives on heparin infusion.  I consulted Cardiology, Dr. Giraldo recommends NPO after midnight and continue heparin.    Risk  Prescription drug management.  Decision regarding hospitalization.               ED Course as of 05/10/24 2313   Fri May 10, 2024   2147 Paged interventional cardiology: Sami Giraldo MD [LH]   [id number] Dr. Giraldo recommends will see in consult, keep NPO after midnight, continue heparin infusion [KG]      ED Course User Index  [KG] nAthony Live MD  [LH] Austen Best                             Clinical Impression:  Final diagnoses:  [I73.9] Peripheral arterial disease (Primary)  [I73.9] Claudication of both lower extremities  [Z87.891] History of smoking          ED Disposition Condition    Admit Stable                Anthony Live MD  05/10/24 8738

## 2024-05-11 NOTE — H&P
[facility] Hospital Medicine History & Physical Examination       Patient Name: Zita Read  MRN: [id number]  Patient Class: IP- Inpatient   Admission Date: 5/10/2024  8:47 PM  Length of Stay: 0  Admitting Service: Hospital Medicine   Attending Physician: James Black MD   Primary Care Provider: Gilberto Flores III, MD  History source: EMR, patient and/or patient's family    CHIEF COMPLAINT   Transfer (Transfer from [facility] for vascular sx w/ complete occlusion of left femoral artery)      HISTORY OF PRESENT ILLNESS:   Ms. Read is a 73 year old female with a pmh of HTN who presented to our ED as a transfer from [facility] for vascular services.  She originally presented there with bilateral foot pain, worse on the left.  A CTA was done which showed a complete occlusion of the left femoral artery with diffuse narrowing and partial occlusion of the right popliteal artery.  She denies any history of CAD.  However, she has a smoker.  She was started on heparin drip there and transferred ED to ED.    ED vitals on arrival:  Temp 98.2° F, pulse 62, resp 18, /67, SpO2 99% on RA.  ED lab work performed here showed H&H 10.3/31.3, .4, creatinine 1.55.  Cardiology was consulted in the ED. She was admitted to Hospital Medicine for management.      PAST MEDICAL HISTORY:     Hypertension    PAST SURGICAL HISTORY:     Left foot bone spur removal  Right knee surgery  Right breast surgery  Total hysterectomy      ALLERGIES:   Levaquin [levofloxacin], Lithium analogues, and Pcn [penicillins]    FAMILY HISTORY:   Reviewed and non-contributory     SOCIAL HISTORY:   Screening for Social Drivers for health: Patient screened for food insecurity, housing instability, transportation needs, utility   difficulties, and interpersonal safety (select all that apply as identified as concern)  []Housing or Food  []Transportation Needs  []Utility Difficulties  []Interpersonal  "safety  [x]None    Tobacco:  Smokes 1/2 ppd  Etoh:  Denies  Illicit drug use:  Denies       HOME MEDICATIONS:     Prior to Admission medications    Not on File       REVIEW OF SYSTEMS:   Except as documented, all other systems reviewed and negative     PHYSICAL EXAM:   T 98.2 °F (36.8 °C)   /83   P 71   RR (!) 26   O2 100 %  GENERAL: awake, alert, oriented and in no acute distress, non-toxic appearing   HEENT: normocephalic atraumatic   NECK: supple   LUNGS: Clear bilaterally, no wheezing or rales, no accessory muscle use   CVS: Regular rate and rhythm, normal peripheral perfusion  ABD: Soft, non-tender, non-distended, bowel sounds present  EXTREMITIES: Bilateral feet cool, somewhat mottled appearing, unable to palpate dorsalis pedis pulses.  SKIN: Warm, dry.   NEURO: alert and oriented, grossly without focal deficits   PSYCHIATRIC: Cooperative    LABS AND IMAGING:     Recent Labs     05/10/24  2152   WBC 8.82   RBC 2.90*   HGB 10.3*   HCT 31.3*   .9*   MCH 35.5*   MCHC 32.9*   RDW 15.4        No results for input(s): "LACTIC" in the last 72 hours.  Recent Labs     05/10/24  2152   INR 1.1     No results for input(s): "HGBA1C", "CHOL", "TRIG", "LDL", "VLDL", "HDL" in the last 72 hours.   Recent Labs     05/10/24  2152      K 3.8   CHLORIDE 116*   CO2 20*   BUN 18.6   CREATININE 1.55*   GLUCOSE 90   CALCIUM 8.2*   ALBUMIN 3.0*   GLOBULIN 2.2*   ALKPHOS 60   ALT 13   AST 18   BILITOT 0.4     No results for input(s): "BNP", "CPK", "TROPONINI" in the last 72 hours.       No image results found.      ASSESSMENT & PLAN:     Left femoral artery complete occlusion and right popliteal artery partial occlusion  Acute kidney injury    History:  Hypertension    Plan:  -continue heparin drip per protocol  -cardiology following consult placed  -Telemetry monitoring  -O2 delivery as needed  -LR @ 75 ml/hr  -Monitor Creatinine  -Antihypertensives as needed  -Resume home meds as appropriate once " available  -Labs in AM     DVT prophylaxis: on heparin gtt  Code status: Full    ________________________________________________________________________  I, Dr. James Black assumed care of this patient.  For the patient encounter, I performed the substantive portion of the visit, I reviewed the NPPA documentation, treatment plan, and medical decision making.  I had face to face time with this patient.  I have personally reviewed the labs and test results that are presently available. I have reviewed or attempted to review medical records based upon their availability. If patient was admitted under observational status it is with my approval.      73-year-old female presents with claudication bilateral lower extremities, CTA showed complete occlusion left femoral artery and diffuse narrowing and partial occlusion of the right popliteal artery.  She was placed on a heparin drip and transferred to this facility for Cardiology, ER spoke with severe nausea who plans to do peripheral angiography in the morning.  Currently pulses are difficult to palpate, she reports pain with rest in both feet, she has a small lack on her left dorsum of the foot from a prior surgery, and agree with remainder of above exam.  Continue heparin drip.  NPO for revascularization in the a.m..    Time seen:  11:00 p.m. 05/10/2024  Critical care time: 35 min; Critical care diagnosis:  Critical limb ischemia/hep gtt  James Black MD

## 2024-05-11 NOTE — NURSING
.Nurses Note -- 4 Eyes      5/11/2024   5:47 AM      Skin assessed during: Admit      [x] No Altered Skin Integrity Present    []Prevention Measures Documented      [] Yes- Altered Skin Integrity Present or Discovered   [] LDA Added if Not in Epic (Describe Wound)   [] New Altered Skin Integrity was Present on Admit and Documented in LDA   [] Wound Image Taken    Wound Care Consulted? No    Attending Nurse:  Poncho Rodriguez Lpn    Second RN/Staff Member:   Carrie Guillory Rn       Pt does have an old wound to the L foot:  intact, no drainage, sonja, approximated edges

## 2024-05-11 NOTE — PROGRESS NOTES
[facility]  Hospital Medicine Progress Note      Chief Complaint: Bilateral foot pain     HPI: (personally reviewed by me and is documented from initial H&P)     73 year old female with a pmh of HTN who presented to our ED as a transfer from [facility] for vascular services.      She originally presented there with bilateral foot pain, worse on the left.  A CTA was done which showed a complete occlusion of the left femoral artery with diffuse narrowing and partial occlusion of the right popliteal artery.  She denies any history of CAD.      She was started on heparin drip there and transferred ED to ED.     ED vitals on arrival:  Temp 98.2° F, pulse 62, resp 18, /67, SpO2 99% on RA.  ED lab work performed here showed H&H 10.3/31.3, .4, creatinine 1.55.  Cardiology was consulted in the ED. She was admitted to Hospital Medicine for management.    Interval Hx:   Patient smokes a half pack of cigarettes a day.  No overnight events reported.  No new complaints.    Patient currently sleeping in bed and not interested in talking.      She does tell me that her bilateral foot pain has been going on for as long as she has been sick, 4 months.    __________________________________________________________________________________________________________________________________    Objective/physical exam:  Vital signs have been personally reviewed by me   General: Appears comfortable, no acute distress.  Neuro: awake, alert, oriented.     Bilateral lower extremities with mild redness/discoloration noted on toes with significant tenderness to touch bilaterally, no swelling appreciated.    Integumentary: Warm, dry, intact.  Musculoskeletal: Purposeful movement noted.     Respiratory: No accessory muscle use. Breath sounds are equal.  Cardiovascular: Regular rate.       VITAL SIGNS: 24 HRS MIN & MAX LAST   Temp  Min: 97.8 °F (36.6 °C)  Max: 98.2 °F (36.8 °C) 98.1 °F (36.7 °C)   BP  Min:  129/71  Max: 155/79 (!) 152/65   Pulse  Min: 55  Max: 71  65   Resp  Min: 14  Max: 26 (!) 26   SpO2  Min: 98 %  Max: 100 % 98 %     No image results found.    Recent Labs   Lab 05/10/24  2152 05/11/24  0405   WBC 8.82 7.98   RBC 2.90* 3.13*   HGB 10.3* 11.1*   HCT 31.3* 33.8*   .9* 108.0*   MCH 35.5* 35.5*   MCHC 32.9* 32.8*   RDW 15.4 15.4    191   MPV 10.5* 10.8*       Recent Labs   Lab 05/10/24  2152 05/11/24  0405    145   K 3.8 4.0   CO2 20* 21*   BUN 18.6 20.1   CREATININE 1.55* 1.51*   CALCIUM 8.2* 8.3*   MG  --  1.70   ALBUMIN 3.0* 2.8*   ALKPHOS 60 61   ALT 13 13   AST 18 14   BILITOT 0.4 0.3     Microbiology Results (last 7 days)       ** No results found for the last 168 hours. **             Scheduled Med:     Continuous Infusions:   heparin (porcine) in D5W  0-40 Units/kg/hr (Adjusted) Intravenous Continuous 11.3 mL/hr at 05/10/24 2129 18 Units/kg/hr at 05/10/24 2129    lactated ringers   Intravenous Continuous 75 mL/hr at 05/11/24 0533 New Bag at 05/11/24 0533        PRN Meds:    Current Facility-Administered Medications:     acetaminophen, 650 mg, Oral, Q6H PRN    aluminum-magnesium hydroxide-simethicone, 30 mL, Oral, QID PRN    heparin (PORCINE), 60 Units/kg (Adjusted), Intravenous, PRN    heparin (PORCINE), 30 Units/kg (Adjusted), Intravenous, PRN    melatonin, 6 mg, Oral, Nightly PRN    naloxone, 0.02 mg, Intravenous, PRN    ondansetron, 4 mg, Intravenous, Q4H PRN    polyethylene glycol, 17 g, Oral, BID PRN    prochlorperazine, 5 mg, Intravenous, Q6H PRN    simethicone, 1 tablet, Oral, QID PRN   __________________________________________________________________________________________________________________________________  Assessment/Plan:  Left popliteal artery occlusion complete  Right popliteal artery partial occlusion  Acute kidney injury  Hypertension   Tobacco user    Above present on admission     Anticipated discharge and Disposition when medically  stable:  Pending  _______________________________________________________________________________________________________________________________  ----peripheral arterial disease, popliteal occlusion complete on the left and right popliteal artery occlusion is partial.  Patient currently on heparin drip, continue for now.  Cardiologist consulted with plans for angiogram today.    Risk factors reviewed includes hypertension tobacco use.  Will check hemoglobin A1c and lipid profile.  Follow-up on cardiologist's recommendations   Start aspirin 325 continue with heparin    ----suspected acute kidney injury, baseline unknown; will order urinalysis to evaluate for proteinuria    ----macrocytic anemia, will order B12 and folate  Nurse to verify patient's home meds and will reconciled thereafter  Continue supportive care  Continue checking vital signs q4hrs.       Nurse notified to page me if any changes occur     DVT prophylaxis initiated   Nutrition Status:  NPO for now    Consults:  Cardiologist  I have personally reviewed the specialist documentation and/or have spoken to the specialist with regard to the care of this patient; recommendations are noted above.     _______________________________________________________________________________________________________________________________    This is a critical care document  Critical Care Diagnosis:sever peripheral arterial disease, heparin ggt with continuous monitoring.  Critical care interventions: hands on evaluation, review of labs/radiographs/records and discussions; assessing and managing the high probability of imminent or life-threatening deterioration of cardiorespiratory status.  Critical care time spent 45 minutes.     time spent includes face to face time and non-face to face time preparing to see the patient (eg, review of tests), independently reviewing and interpreting medical records, both past and current; documenting clinical information in the  electronic or other health record, and communicating results to the patient/family/caregiver and care coordinator and nursing team.      All diagnosis and differential diagnosis have been reviewed,  interpreted and communicated appropriately to care team. assessment and plan has been documented; I have personally reviewed the labs and test results that are presently available and pertinent to this hospital course; I have reviewed medical records based upon their availability.    All of the patient's questions have been  addressed and answered. Patient's is agreeable to the above stated plan.   I will continue to monitor closely and make adjustments to medical management as needed.    Kadi Vergara,    05/11/2024     This note was created with the assistance of Dragon voice recognition software. There may be transcription errors as a result of using this technology however minimal. Effort has been made to assure accuracy of transcription but any obvious errors or omissions should be clarified with the author of the document.

## 2024-05-12 LAB
ABORH RETYPE: NORMAL
ANION GAP SERPL CALC-SCNC: 6 MEQ/L
BASOPHILS # BLD AUTO: 0.01 X10(3)/MCL
BASOPHILS NFR BLD AUTO: 0.1 %
BUN SERPL-MCNC: 15.2 MG/DL (ref 9.8–20.1)
CALCIUM SERPL-MCNC: 7.8 MG/DL (ref 8.4–10.2)
CHLORIDE SERPL-SCNC: 118 MMOL/L (ref 98–107)
CHOLEST SERPL-MCNC: 156 MG/DL
CHOLEST/HDLC SERPL: 7 {RATIO} (ref 0–5)
CO2 SERPL-SCNC: 20 MMOL/L (ref 23–31)
CREAT SERPL-MCNC: 1.31 MG/DL (ref 0.55–1.02)
CREAT/UREA NIT SERPL: 12
EOSINOPHIL # BLD AUTO: 0.18 X10(3)/MCL (ref 0–0.9)
EOSINOPHIL NFR BLD AUTO: 2 %
ERYTHROCYTE [DISTWIDTH] IN BLOOD BY AUTOMATED COUNT: 15.5 % (ref 11.5–17)
EST. AVERAGE GLUCOSE BLD GHB EST-MCNC: 111.2 MG/DL
FOLATE SERPL-MCNC: 4.4 NG/ML (ref 7–31.4)
GFR SERPLBLD CREATININE-BSD FMLA CKD-EPI: 43 ML/MIN/1.73/M2
GLUCOSE SERPL-MCNC: 120 MG/DL (ref 82–115)
GROUP & RH: NORMAL
HBA1C MFR BLD: 5.5 %
HCT VFR BLD AUTO: 26.7 % (ref 37–47)
HCT VFR BLD AUTO: 27.7 % (ref 37–47)
HCT VFR BLD AUTO: 29.9 % (ref 37–47)
HDLC SERPL-MCNC: 24 MG/DL (ref 35–60)
HGB BLD-MCNC: 8.6 G/DL (ref 12–16)
HGB BLD-MCNC: 8.7 G/DL (ref 12–16)
HGB BLD-MCNC: 9.7 G/DL (ref 12–16)
IMM GRANULOCYTES # BLD AUTO: 0.13 X10(3)/MCL (ref 0–0.04)
IMM GRANULOCYTES NFR BLD AUTO: 1.4 %
INDIRECT COOMBS: NORMAL
IRON SATN MFR SERPL: 13 % (ref 20–50)
IRON SERPL-MCNC: 23 UG/DL (ref 50–170)
LDLC SERPL CALC-MCNC: 103 MG/DL (ref 50–140)
LYMPHOCYTES # BLD AUTO: 0.9 X10(3)/MCL (ref 0.6–4.6)
LYMPHOCYTES NFR BLD AUTO: 9.9 %
MAGNESIUM SERPL-MCNC: 1.6 MG/DL (ref 1.6–2.6)
MCH RBC QN AUTO: 34.5 PG (ref 27–31)
MCHC RBC AUTO-ENTMCNC: 32.4 G/DL (ref 33–36)
MCV RBC AUTO: 106.4 FL (ref 80–94)
MONOCYTES # BLD AUTO: 0.57 X10(3)/MCL (ref 0.1–1.3)
MONOCYTES NFR BLD AUTO: 6.3 %
NEUTROPHILS # BLD AUTO: 7.28 X10(3)/MCL (ref 2.1–9.2)
NEUTROPHILS NFR BLD AUTO: 80.3 %
NRBC BLD AUTO-RTO: 0 %
PHOSPHATE SERPL-MCNC: 2.4 MG/DL (ref 2.3–4.7)
PLATELET # BLD AUTO: 202 X10(3)/MCL (ref 130–400)
PMV BLD AUTO: 10.6 FL (ref 7.4–10.4)
POTASSIUM SERPL-SCNC: 4 MMOL/L (ref 3.5–5.1)
RBC # BLD AUTO: 2.81 X10(6)/MCL (ref 4.2–5.4)
SODIUM SERPL-SCNC: 144 MMOL/L (ref 136–145)
SPECIMEN OUTDATE: NORMAL
TIBC SERPL-MCNC: 151 UG/DL (ref 70–310)
TIBC SERPL-MCNC: 174 UG/DL (ref 250–450)
TRANSFERRIN SERPL-MCNC: 157 MG/DL (ref 173–360)
TRIGL SERPL-MCNC: 144 MG/DL (ref 37–140)
VLDLC SERPL CALC-MCNC: 29 MG/DL
WBC # SPEC AUTO: 9.07 X10(3)/MCL (ref 4.5–11.5)

## 2024-05-12 PROCEDURE — 85025 COMPLETE CBC W/AUTO DIFF WBC: CPT | Performed by: NURSE PRACTITIONER

## 2024-05-12 PROCEDURE — 82746 ASSAY OF FOLIC ACID SERUM: CPT | Performed by: STUDENT IN AN ORGANIZED HEALTH CARE EDUCATION/TRAINING PROGRAM

## 2024-05-12 PROCEDURE — 25000003 PHARM REV CODE 250: Performed by: INTERNAL MEDICINE

## 2024-05-12 PROCEDURE — 36430 TRANSFUSION BLD/BLD COMPNT: CPT

## 2024-05-12 PROCEDURE — 99900035 HC TECH TIME PER 15 MIN (STAT)

## 2024-05-12 PROCEDURE — 83036 HEMOGLOBIN GLYCOSYLATED A1C: CPT | Performed by: STUDENT IN AN ORGANIZED HEALTH CARE EDUCATION/TRAINING PROGRAM

## 2024-05-12 PROCEDURE — 86923 COMPATIBILITY TEST ELECTRIC: CPT | Performed by: NURSE PRACTITIONER

## 2024-05-12 PROCEDURE — S4991 NICOTINE PATCH NONLEGEND: HCPCS | Performed by: INTERNAL MEDICINE

## 2024-05-12 PROCEDURE — 25000003 PHARM REV CODE 250: Performed by: NURSE PRACTITIONER

## 2024-05-12 PROCEDURE — 80061 LIPID PANEL: CPT | Performed by: STUDENT IN AN ORGANIZED HEALTH CARE EDUCATION/TRAINING PROGRAM

## 2024-05-12 PROCEDURE — 83540 ASSAY OF IRON: CPT | Performed by: INTERNAL MEDICINE

## 2024-05-12 PROCEDURE — 83735 ASSAY OF MAGNESIUM: CPT | Performed by: STUDENT IN AN ORGANIZED HEALTH CARE EDUCATION/TRAINING PROGRAM

## 2024-05-12 PROCEDURE — 84100 ASSAY OF PHOSPHORUS: CPT | Performed by: STUDENT IN AN ORGANIZED HEALTH CARE EDUCATION/TRAINING PROGRAM

## 2024-05-12 PROCEDURE — 83921 ORGANIC ACID SINGLE QUANT: CPT | Performed by: STUDENT IN AN ORGANIZED HEALTH CARE EDUCATION/TRAINING PROGRAM

## 2024-05-12 PROCEDURE — P9016 RBC LEUKOCYTES REDUCED: HCPCS | Performed by: NURSE PRACTITIONER

## 2024-05-12 PROCEDURE — 86850 RBC ANTIBODY SCREEN: CPT | Performed by: NURSE PRACTITIONER

## 2024-05-12 PROCEDURE — 94760 N-INVAS EAR/PLS OXIMETRY 1: CPT

## 2024-05-12 PROCEDURE — 27000221 HC OXYGEN, UP TO 24 HOURS

## 2024-05-12 PROCEDURE — 80048 BASIC METABOLIC PNL TOTAL CA: CPT | Performed by: STUDENT IN AN ORGANIZED HEALTH CARE EDUCATION/TRAINING PROGRAM

## 2024-05-12 PROCEDURE — 63600175 PHARM REV CODE 636 W HCPCS: Performed by: INTERNAL MEDICINE

## 2024-05-12 PROCEDURE — 21400001 HC TELEMETRY ROOM

## 2024-05-12 PROCEDURE — 30233N1 TRANSFUSION OF NONAUTOLOGOUS RED BLOOD CELLS INTO PERIPHERAL VEIN, PERCUTANEOUS APPROACH: ICD-10-PCS | Performed by: INTERNAL MEDICINE

## 2024-05-12 PROCEDURE — 82607 VITAMIN B-12: CPT | Performed by: STUDENT IN AN ORGANIZED HEALTH CARE EDUCATION/TRAINING PROGRAM

## 2024-05-12 PROCEDURE — 86900 BLOOD TYPING SEROLOGIC ABO: CPT | Performed by: NURSE PRACTITIONER

## 2024-05-12 PROCEDURE — 85018 HEMOGLOBIN: CPT | Performed by: NURSE PRACTITIONER

## 2024-05-12 PROCEDURE — 36415 COLL VENOUS BLD VENIPUNCTURE: CPT | Performed by: STUDENT IN AN ORGANIZED HEALTH CARE EDUCATION/TRAINING PROGRAM

## 2024-05-12 RX ORDER — FUROSEMIDE 10 MG/ML
20 INJECTION INTRAMUSCULAR; INTRAVENOUS ONCE
Status: DISCONTINUED | OUTPATIENT
Start: 2024-05-12 | End: 2024-05-13

## 2024-05-12 RX ORDER — HYDROCODONE BITARTRATE AND ACETAMINOPHEN 7.5; 325 MG/1; MG/1
1 TABLET ORAL EVERY 12 HOURS PRN
COMMUNITY

## 2024-05-12 RX ORDER — ATORVASTATIN CALCIUM 40 MG/1
40 TABLET, FILM COATED ORAL DAILY
Status: DISCONTINUED | OUTPATIENT
Start: 2024-05-12 | End: 2024-05-13 | Stop reason: HOSPADM

## 2024-05-12 RX ORDER — OXYBUTYNIN CHLORIDE 5 MG/1
5 TABLET ORAL 2 TIMES DAILY
Status: ON HOLD | COMMUNITY
End: 2024-05-13 | Stop reason: HOSPADM

## 2024-05-12 RX ORDER — GABAPENTIN 300 MG/1
300 CAPSULE ORAL DAILY
COMMUNITY

## 2024-05-12 RX ORDER — LISINOPRIL 5 MG/1
5 TABLET ORAL DAILY
Status: DISCONTINUED | OUTPATIENT
Start: 2024-05-13 | End: 2024-05-13 | Stop reason: HOSPADM

## 2024-05-12 RX ORDER — FUROSEMIDE 40 MG/1
40 TABLET ORAL DAILY
COMMUNITY

## 2024-05-12 RX ORDER — LISINOPRIL 5 MG/1
5 TABLET ORAL DAILY
COMMUNITY

## 2024-05-12 RX ORDER — MONTELUKAST SODIUM 10 MG/1
10 TABLET ORAL DAILY
COMMUNITY

## 2024-05-12 RX ORDER — DIAZEPAM 5 MG/1
5 TABLET ORAL NIGHTLY
COMMUNITY

## 2024-05-12 RX ORDER — IBUPROFEN 200 MG
1 TABLET ORAL DAILY
Status: DISCONTINUED | OUTPATIENT
Start: 2024-05-12 | End: 2024-05-13 | Stop reason: HOSPADM

## 2024-05-12 RX ORDER — HYDROCODONE BITARTRATE AND ACETAMINOPHEN 500; 5 MG/1; MG/1
TABLET ORAL
Status: DISCONTINUED | OUTPATIENT
Start: 2024-05-12 | End: 2024-05-13 | Stop reason: HOSPADM

## 2024-05-12 RX ORDER — FOLIC ACID 1 MG/1
1 TABLET ORAL DAILY
Status: DISCONTINUED | OUTPATIENT
Start: 2024-05-12 | End: 2024-05-13 | Stop reason: HOSPADM

## 2024-05-12 RX ORDER — FLUTICASONE PROPIONATE 50 UG/1
50 POWDER, METERED RESPIRATORY (INHALATION) DAILY
COMMUNITY

## 2024-05-12 RX ORDER — ALBUTEROL SULFATE 90 UG/1
1 AEROSOL, METERED RESPIRATORY (INHALATION) EVERY 4 HOURS PRN
COMMUNITY

## 2024-05-12 RX ORDER — FLUTICASONE FUROATE, UMECLIDINIUM BROMIDE AND VILANTEROL TRIFENATATE 200; 62.5; 25 UG/1; UG/1; UG/1
1 POWDER RESPIRATORY (INHALATION) DAILY
COMMUNITY

## 2024-05-12 RX ORDER — ATENOLOL 25 MG/1
25 TABLET ORAL DAILY
COMMUNITY

## 2024-05-12 RX ORDER — ATENOLOL 25 MG/1
25 TABLET ORAL DAILY
Status: DISCONTINUED | OUTPATIENT
Start: 2024-05-13 | End: 2024-05-13 | Stop reason: HOSPADM

## 2024-05-12 RX ORDER — POTASSIUM CHLORIDE 750 MG/1
10 CAPSULE, EXTENDED RELEASE ORAL DAILY
COMMUNITY

## 2024-05-12 RX ORDER — MIRTAZAPINE 30 MG/1
30 TABLET, FILM COATED ORAL NIGHTLY
COMMUNITY

## 2024-05-12 RX ORDER — QUETIAPINE FUMARATE 25 MG/1
25 TABLET, FILM COATED ORAL NIGHTLY
COMMUNITY

## 2024-05-12 RX ADMIN — MORPHINE SULFATE 2 MG: 4 INJECTION INTRAVENOUS at 09:05

## 2024-05-12 RX ADMIN — NICOTINE 1 PATCH: 21 PATCH, EXTENDED RELEASE TRANSDERMAL at 11:05

## 2024-05-12 RX ADMIN — CLOPIDOGREL BISULFATE 75 MG: 75 TABLET ORAL at 08:05

## 2024-05-12 RX ADMIN — FOLIC ACID 1 MG: 1 TABLET ORAL at 11:05

## 2024-05-12 RX ADMIN — ATORVASTATIN CALCIUM 40 MG: 40 TABLET, FILM COATED ORAL at 11:05

## 2024-05-12 RX ADMIN — ACETAMINOPHEN 650 MG: 325 TABLET, FILM COATED ORAL at 01:05

## 2024-05-12 RX ADMIN — ASPIRIN 81 MG: 81 TABLET, COATED ORAL at 08:05

## 2024-05-12 RX ADMIN — HYDROCODONE BITARTRATE AND ACETAMINOPHEN 1 TABLET: 5; 325 TABLET ORAL at 11:05

## 2024-05-12 RX ADMIN — HYDROCODONE BITARTRATE AND ACETAMINOPHEN 1 TABLET: 5; 325 TABLET ORAL at 05:05

## 2024-05-12 NOTE — PROGRESS NOTES
[facility] - [facility] Medical Telemetry    Cardiology  Progress Note    Patient Name: Zita Read  MRN: [id number]  Admission Date: 5/10/2024  Hospital Length of Stay: 2 days  Code Status: Full Code   Attending Provider: Kadi Vergara DO   Consulting Provider: LINDA Villeda  Primary Care Physician: Gilberto Flores III, MD  Principal Problem:Peripheral arterial disease    Patient information was obtained from patient, past medical records, and ER records.     Subjective:     Chief Complaint/Reason for Consult: PAD    HPI:   Ms. Read is a 72y/o female, unknown to [facility], who was transferred from [facility] for vascular services due to BLE pain L>R. Feet were cool with discolored toes. CTA abdomen/pelvis with runoff obtained revealing occlusion of most of Left femoral artery with reconstitution from profunda collateral vessels at the distal femoral artery into the popliteal artery with diffuse narrowing of remaining distal L femoral and popliteal arteries. 3V runoff to mid calf with suboptimal opacification of L AT artery and PT artery at the level of the ankle. RLE revealing contrast opacifiation of R AT, PT and peroneal arteries up to the mid calf with no opacification seen below that level.  She has been placed on a heparin drip and admitted to hospital medicine. [facility]IS consulted due to PAD  ** BLE have cool feet. + numbness/tingling with ischemic changes to left big toe and left pinkie. + monophasic doppler PT pulses BLE and DP pulse to R foot. No pulse noted to  L DP. Appears to be chronic limb ischemia       Hospital Course:  5.12.24: s/p Peripheral angiogram with laser atherectomy, PTA, self expanding stent with 0% residual, 2 vessel runoff via PT and peroneal. RLE noted with patent RSFA, profunda, popliteal artery with 3 vessel runoff and 90% distal AT occlusion.   5.13.24: Patient felt a pop in her groin this morning and developed a large hematoma. Nurse is at bedside applying  manual pressure. BP stable.       PMH: HLD, COPD, PTSD, sarcomatoid carcinoma left lung, left foot bone spur removal 02/2024  PSH: hysterectomy with BSO, cholecystectomy, vertebroplasty, colonoscopy/EGD with dilatation, L lung lobectomy  Family History: father- prostate cancer; mother- HTN,Renal failure, DM2  Social History: current smoker    Previous Cardiac Diagnostics:   Peripheral angiogram 05.11.24:   Distal aorta small, calcified, RCI 30%, LCI 60% calcified, 35 mmHg gradient across Left iliac lesion  2. Right SFA, profunda, popliteal patent, 3 vessel runoff, small vessels, 90% calcified distal PT  3. LEFT % reduced to 0%, pop patent, 2 vessel runoff via PT, peroneal, distal AT occlusion    No past medical history on file.  No past surgical history on file.  Review of patient's allergies indicates:   Allergen Reactions    Levaquin [levofloxacin]     Lithium analogues     Pcn [penicillins]        Review of Systems   Constitutional: Negative.    Respiratory: Negative.     Cardiovascular: Negative.    Gastrointestinal: Negative.    Musculoskeletal:         Right groin pain   Skin:  Positive for color change.       Objective:     Vital Signs (Most Recent):  Temp: 98.3 °F (36.8 °C) (05/12/24 0739)  Pulse: 87 (05/12/24 0739)  Resp: 18 (05/12/24 0450)  BP: (!) 151/72 (05/12/24 0739)  SpO2: 100 % (05/12/24 0739) Vital Signs (24h Range):  Temp:  [97.8 °F (36.6 °C)-98.7 °F (37.1 °C)] 98.3 °F (36.8 °C)  Pulse:  [60-96] 87  Resp:  [18-20] 18  SpO2:  [95 %-100 %] 100 %  BP: (115-164)/(61-80) 151/72   Weight: 63.9 kg (140 lb 14 oz)  Body mass index is 22.06 kg/m².  SpO2: 100 %       Intake/Output Summary (Last 24 hours) at 5/12/2024 0899  Last data filed at 5/12/2024 0643  Gross per 24 hour   Intake --   Output 1100 ml   Net -1100 ml     Lines/Drains/Airways       Peripheral Intravenous Line  Duration                  Peripheral IV - Single Lumen 20 G Anterior;Proximal;Right Forearm -- days         Peripheral IV -  Single Lumen 20 G Right Antecubital -- days                  Significant Labs:  Recent Results (from the past 72 hour(s))   Comprehensive metabolic panel    Collection Time: 05/10/24  9:52 PM   Result Value Ref Range    Sodium 144 136 - 145 mmol/L    Potassium 3.8 3.5 - 5.1 mmol/L    Chloride 116 (H) 98 - 107 mmol/L    CO2 20 (L) 23 - 31 mmol/L    Glucose 90 82 - 115 mg/dL    Blood Urea Nitrogen 18.6 9.8 - 20.1 mg/dL    Creatinine 1.55 (H) 0.55 - 1.02 mg/dL    Calcium 8.2 (L) 8.4 - 10.2 mg/dL    Protein Total 5.2 (L) 5.8 - 7.6 gm/dL    Albumin 3.0 (L) 3.4 - 4.8 g/dL    Globulin 2.2 (L) 2.4 - 3.5 gm/dL    Albumin/Globulin Ratio 1.4 1.1 - 2.0 ratio    Bilirubin Total 0.4 <=1.5 mg/dL    ALP 60 40 - 150 unit/L    ALT 13 0 - 55 unit/L    AST 18 5 - 34 unit/L    eGFR 35 mL/min/1.73/m2   APTT    Collection Time: 05/10/24  9:52 PM   Result Value Ref Range    .4 (H) 23.2 - 33.7 seconds   Protime-INR    Collection Time: 05/10/24  9:52 PM   Result Value Ref Range    PT 13.7 12.5 - 14.5 seconds    INR 1.1 <=1.3   CBC with Differential    Collection Time: 05/10/24  9:52 PM   Result Value Ref Range    WBC 8.82 4.50 - 11.50 x10(3)/mcL    RBC 2.90 (L) 4.20 - 5.40 x10(6)/mcL    Hgb 10.3 (L) 12.0 - 16.0 g/dL    Hct 31.3 (L) 37.0 - 47.0 %    .9 (H) 80.0 - 94.0 fL    MCH 35.5 (H) 27.0 - 31.0 pg    MCHC 32.9 (L) 33.0 - 36.0 g/dL    RDW 15.4 11.5 - 17.0 %    Platelet 209 130 - 400 x10(3)/mcL    MPV 10.5 (H) 7.4 - 10.4 fL    Neut % 53.8 %    Lymph % 37.6 %    Mono % 6.3 %    Eos % 1.4 %    Basophil % 0.2 %    Lymph # 3.32 0.6 - 4.6 x10(3)/mcL    Neut # 4.74 2.1 - 9.2 x10(3)/mcL    Mono # 0.56 0.1 - 1.3 x10(3)/mcL    Eos # 0.12 0 - 0.9 x10(3)/mcL    Baso # 0.02 <=0.2 x10(3)/mcL    IG# 0.06 (H) 0 - 0.04 x10(3)/mcL    IG% 0.7 %    NRBC% 0.0 %   APTT    Collection Time: 05/11/24  4:05 AM   Result Value Ref Range    PTT 64.8 (H) 23.2 - 33.7 seconds   Comprehensive Metabolic Panel (CMP)    Collection Time: 05/11/24  4:05 AM    Result Value Ref Range    Sodium 145 136 - 145 mmol/L    Potassium 4.0 3.5 - 5.1 mmol/L    Chloride 116 (H) 98 - 107 mmol/L    CO2 21 (L) 23 - 31 mmol/L    Glucose 132 (H) 82 - 115 mg/dL    Blood Urea Nitrogen 20.1 9.8 - 20.1 mg/dL    Creatinine 1.51 (H) 0.55 - 1.02 mg/dL    Calcium 8.3 (L) 8.4 - 10.2 mg/dL    Protein Total 5.4 (L) 5.8 - 7.6 gm/dL    Albumin 2.8 (L) 3.4 - 4.8 g/dL    Globulin 2.6 2.4 - 3.5 gm/dL    Albumin/Globulin Ratio 1.1 1.1 - 2.0 ratio    Bilirubin Total 0.3 <=1.5 mg/dL    ALP 61 40 - 150 unit/L    ALT 13 0 - 55 unit/L    AST 14 5 - 34 unit/L    eGFR 36 mL/min/1.73/m2   Magnesium    Collection Time: 05/11/24  4:05 AM   Result Value Ref Range    Magnesium Level 1.70 1.60 - 2.60 mg/dL   Phosphorus    Collection Time: 05/11/24  4:05 AM   Result Value Ref Range    Phosphorus Level 3.5 2.3 - 4.7 mg/dL   CBC with Differential    Collection Time: 05/11/24  4:05 AM   Result Value Ref Range    WBC 7.98 4.50 - 11.50 x10(3)/mcL    RBC 3.13 (L) 4.20 - 5.40 x10(6)/mcL    Hgb 11.1 (L) 12.0 - 16.0 g/dL    Hct 33.8 (L) 37.0 - 47.0 %    .0 (H) 80.0 - 94.0 fL    MCH 35.5 (H) 27.0 - 31.0 pg    MCHC 32.8 (L) 33.0 - 36.0 g/dL    RDW 15.4 11.5 - 17.0 %    Platelet 191 130 - 400 x10(3)/mcL    MPV 10.8 (H) 7.4 - 10.4 fL    Neut % 51.4 %    Lymph % 38.8 %    Mono % 7.0 %    Eos % 1.5 %    Basophil % 0.4 %    Lymph # 3.10 0.6 - 4.6 x10(3)/mcL    Neut # 4.10 2.1 - 9.2 x10(3)/mcL    Mono # 0.56 0.1 - 1.3 x10(3)/mcL    Eos # 0.12 0 - 0.9 x10(3)/mcL    Baso # 0.03 <=0.2 x10(3)/mcL    IG# 0.07 (H) 0 - 0.04 x10(3)/mcL    IG% 0.9 %    NRBC% 0.0 %   Hemoglobin A1C    Collection Time: 05/12/24  5:16 AM   Result Value Ref Range    Hemoglobin A1c 5.5 <=7.0 %    Estimated Average Glucose 111.2 mg/dL   Folate    Collection Time: 05/12/24  5:17 AM   Result Value Ref Range    Folate Level 4.4 (L) 7.0 - 31.4 ng/mL   Lipid Panel    Collection Time: 05/12/24  5:17 AM   Result Value Ref Range    Cholesterol Total 156 <=200 mg/dL     HDL Cholesterol 24 (L) 35 - 60 mg/dL    Triglyceride 144 (H) 37 - 140 mg/dL    Cholesterol/HDL Ratio 7 (H) 0 - 5    Very Low Density Lipoprotein 29     LDL Cholesterol 103.00 50.00 - 140.00 mg/dL   Basic Metabolic Panel    Collection Time: 05/12/24  5:17 AM   Result Value Ref Range    Sodium 144 136 - 145 mmol/L    Potassium 4.0 3.5 - 5.1 mmol/L    Chloride 118 (H) 98 - 107 mmol/L    CO2 20 (L) 23 - 31 mmol/L    Glucose 120 (H) 82 - 115 mg/dL    Blood Urea Nitrogen 15.2 9.8 - 20.1 mg/dL    Creatinine 1.31 (H) 0.55 - 1.02 mg/dL    BUN/Creatinine Ratio 12     Calcium 7.8 (L) 8.4 - 10.2 mg/dL    Anion Gap 6.0 mEq/L    eGFR 43 mL/min/1.73/m2   Magnesium    Collection Time: 05/12/24  5:17 AM   Result Value Ref Range    Magnesium Level 1.60 1.60 - 2.60 mg/dL   Phosphorus    Collection Time: 05/12/24  5:17 AM   Result Value Ref Range    Phosphorus Level 2.4 2.3 - 4.7 mg/dL     Significant Imaging:  Imaging Results    None       Physical Exam  Cardiovascular:      Rate and Rhythm: Normal rate and regular rhythm.      Pulses: Normal pulses.      Heart sounds: Normal heart sounds.   Pulmonary:      Effort: Pulmonary effort is normal.      Breath sounds: Normal breath sounds.   Abdominal:      Palpations: Abdomen is soft.   Musculoskeletal:         General: Normal range of motion.   Skin:     General: Skin is warm.      Comments: Large hematoma to right groin  Scar to left proximal foot due to bone spur removal  LLE warm  Cool right foot   Neurological:      General: No focal deficit present.      Mental Status: She is alert.   Psychiatric:         Mood and Affect: Mood normal.       Home Medications:   No current facility-administered medications on file prior to encounter.     No current outpatient medications on file prior to encounter.     Current Inpatient Medications:    Current Facility-Administered Medications:     acetaminophen tablet 650 mg, 650 mg, Oral, Q6H PRN, Valeri Diana, AGACNP-BC, 650 mg at 05/12/24  0153    aluminum-magnesium hydroxide-simethicone 200-200-20 mg/5 mL suspension 30 mL, 30 mL, Oral, QID PRN, Valeri Diana AGACNP-BC    aspirin EC tablet 81 mg, 81 mg, Oral, Daily, Ananya Kelley, FNP, 81 mg at 05/12/24 0822    clopidogreL tablet 75 mg, 75 mg, Oral, Daily, Ananya Kelley, FNP, 75 mg at 05/12/24 0822    hydrALAZINE injection 10 mg, 10 mg, Intravenous, Q4H PRN, Sami Giraldo MD    HYDROcodone-acetaminophen 5-325 mg per tablet 1 tablet, 1 tablet, Oral, Q4H PRN, Sami Giraldo MD, 1 tablet at 05/11/24 1551    lactated ringers infusion, , Intravenous, Continuous, Valeri Diana AGACNP-BC, Last Rate: 75 mL/hr at 05/11/24 0533, New Bag at 05/11/24 0533    melatonin tablet 6 mg, 6 mg, Oral, Nightly PRN, Valeri Diana AGACNP-BC    morphine injection 2 mg, 2 mg, Intravenous, Q4H PRN, Sami Giraldo MD    naloxone 0.4 mg/mL injection 0.02 mg, 0.02 mg, Intravenous, PRN, Valeri Diana AGACNP-BC    ondansetron injection 4 mg, 4 mg, Intravenous, Q4H PRN, Valeri Diana AGACNP-BC    polyethylene glycol packet 17 g, 17 g, Oral, BID PRN, Valeri Diana AGACNP-BC    prochlorperazine injection Soln 5 mg, 5 mg, Intravenous, Q6H PRN, Valeri Diana AGACNP-BC    simethicone chewable tablet 80 mg, 1 tablet, Oral, QID PRN, Valeri Diana AGACNP-BC  VTE Risk Mitigation (From admission, onward)           Ordered     IP VTE HIGH RISK PATIENT  Once         05/10/24 2344     Place sequential compression device  Until discontinued         05/10/24 2344     Reason for No Pharmacological VTE Prophylaxis  Once        Question:  Reasons:  Answer:  IV Heparin w/in 24 hrs. Pre or Post-Op    05/10/24 7222                  Assessment:   CLI  -CTA abdomen/pelvis and BLE 5.10.24: occlusion of most of Left femoral artery with reconstitution from profunda collateral vessels at the distal femoral artery into the popliteal artery with diffuse narrowing of remaining distal L femoral and  popliteal arteries. 3V runoff to mid calf with suboptimal opacification of L AT artery and PT artery at the level of the ankle. RLE revealing contrast opacifiation of R AT, PT and peroneal arteries up to the mid calf with no opacification seen below that level.   --Peripheral angiogram 05.11.24: Distal aorta small, calcified, RCI 30%, LCI 60% calcified, 35 mmHg gradient across Left iliac lesion. Right SFA, profunda, popliteal patent, 3 vessel runoff, small vessels, 90% calcified distal PT. LEFT % reduced to 0%, pop patent, 2 vessel runoff via PT, peroneal, distal AT occlusion   Right groin hematoma  COPD  HLD  --  PTSD  DORIAN  --improving  Hx sarcomatoid carcinoma of left long  Tobacco dependence  Hx bone spur removal left foot    Plan:   Manual pressure to attain hemostasis  BR x 4 hours  Obtain stat R groin US to rule out pseudoaneurysm  Continue aspirin, plavix, and statin  H/H q 6hrs. First one now. Transfuse for Hgb < 8  Pain management per primary  Smoking cessation      Iliac stenosis is moderate.  If wound is slow to heal, can consider intervention to left AT and iliac as outpatient.    Patient should have follow up with podiatry or wound care as outpatient    Discussed with Dr. Erika Kelley, LINDA  Cardiology  [facility] - [facility] Telemetry  05/12/2024

## 2024-05-12 NOTE — PROGRESS NOTES
[facility] Medicine Progress Note        Chief Complaint: Inpatient Follow-up for cli    HPI: Ms. Read is a 74y/o female, unknown to [facility], who was transferred from [facility] for vascular services due to BLE pain L>R. Feet were cool with discolored toes. CTA abdomen/pelvis with runoff obtained revealing occlusion of most of Left femoral artery with reconstitution from profunda collateral vessels at the distal femoral artery into the popliteal artery with diffuse narrowing of remaining distal L femoral and popliteal arteries. 3V runoff to mid calf with suboptimal opacification of L AT artery and PT artery at the level of the ankle. RLE revealing contrast opacifiation of R AT, PT and peroneal arteries up to the mid calf with no opacification seen below that level.  She has been placed on a heparin drip and admitted to hospital medicine. CIS consulted due to PAD  ** BLE have cool feet. + numbness/tingling with ischemic changes to left big toe and left pinkie. + monophasic doppler PT pulses BLE and DP pulse to R foot. No pulse noted to  L DP. Appears to be chronic limb ischemia      Pt is post peripheral angiogram   Findings:  1. Distal aorta small, calcified, RCI 30%, LCI 60% calcified, 35 mmHg gradient across Left iliac lesion  2. Right SFA, profunda, popliteal patent, 3 vessel runoff, small vessels, 90% calcified distal PT  3. LEFT % reduced to 0%, pop patent, 2 vessel runoff via PT, peroneal, distal AT occlusion      Procedures performed:  1. Ultrasound-guided right groin access  2. Aortography, BLE angiography  3. Laser, IVUS, PTAS Left SFA with 6x150 Innova self expanding stent  4. Angioseal closure right CFA    Placed on asa/plavix and lipitor          Interval Hx:     5/12/24 dr price - chart reviewed and pt examied . Voices no complains and feels fine . Pt on asa/plavix/lipitor. We discussed tobacco cessation in view of advanced PAD. She is willing to try nicotone  "patches.   Pt will be discharge tomorrow am to follolw up with cis.  Pt has a chronic wound over dorsal aspect of foot that has not healed since she had her procedure in march( bone spur..requiring instrumentation ?      Pt now with hematoma over right inguinal area>>>>>nursing staff applying pressure>>cis notified and US ordered    Case was discussed with patient's nurse and  on the floor.    Objective/physical exam:  General: In no acute distress, afebrile  Chest: Clear to auscultation bilaterally  Heart: RRR, +S1, S2, no appreciable murmur  Abdomen: Soft, nontender, BS +  MSK: Warm, no lower extremity edema, no clubbing or cyanosis  Left foot dorsal aspect -2" wound w  no discharge / good granulation tissue/ no necrosis  Neurologic: Alert and oriented x4, Cranial nerve II-XII intact, Strength 5/5 in all 4 extremities    VITAL SIGNS: 24 HRS MIN & MAX LAST   Temp  Min: 97.8 °F (36.6 °C)  Max: 98.7 °F (37.1 °C) 98.3 °F (36.8 °C)   BP  Min: 115/61  Max: 164/71 (!) 151/72   Pulse  Min: 60  Max: 96  87   Resp  Min: 18  Max: 20 18   SpO2  Min: 95 %  Max: 100 % 100 %     I have reviewed the following labs:  Recent Labs   Lab 05/10/24  2152 05/11/24  0405   WBC 8.82 7.98   RBC 2.90* 3.13*   HGB 10.3* 11.1*   HCT 31.3* 33.8*   .9* 108.0*   MCH 35.5* 35.5*   MCHC 32.9* 32.8*   RDW 15.4 15.4    191   MPV 10.5* 10.8*     Recent Labs   Lab 05/10/24  2152 05/11/24  0405 05/12/24  0517    145 144   K 3.8 4.0 4.0   CO2 20* 21* 20*   BUN 18.6 20.1 15.2   CREATININE 1.55* 1.51* 1.31*   CALCIUM 8.2* 8.3* 7.8*   MG  --  1.70 1.60   ALBUMIN 3.0* 2.8*  --    ALKPHOS 60 61  --    ALT 13 13  --    AST 18 14  --    BILITOT 0.4 0.3  --      Microbiology Results (last 7 days)       ** No results found for the last 168 hours. **             See below for Radiology    Scheduled Med:   aspirin  81 mg Oral Daily    atorvastatin  40 mg Oral Daily    clopidogreL  75 mg Oral Daily      Continuous Infusions:   " lactated ringers   Intravenous Continuous 75 mL/hr at 05/11/24 0533 New Bag at 05/11/24 0533      PRN Meds:    Current Facility-Administered Medications:     acetaminophen, 650 mg, Oral, Q6H PRN    aluminum-magnesium hydroxide-simethicone, 30 mL, Oral, QID PRN    hydrALAZINE, 10 mg, Intravenous, Q4H PRN    HYDROcodone-acetaminophen, 1 tablet, Oral, Q4H PRN    melatonin, 6 mg, Oral, Nightly PRN    morphine, 2 mg, Intravenous, Q4H PRN    naloxone, 0.02 mg, Intravenous, PRN    ondansetron, 4 mg, Intravenous, Q4H PRN    polyethylene glycol, 17 g, Oral, BID PRN    prochlorperazine, 5 mg, Intravenous, Q6H PRN    simethicone, 1 tablet, Oral, QID PRN     Assessment/Plan:  Bilateral PAD with Left CLI  -CTA abdomen/pelvis and BLE 5.10.24: occlusion of most of Left femoral artery with reconstitution from profunda collateral vessels at the distal femoral artery into the popliteal artery with diffuse narrowing of remaining distal L femoral and popliteal arteries. 3V runoff to mid calf with suboptimal opacification of L AT artery and PT artery at the level of the ankle. RLE revealing contrast opacifiation of R AT, PT and peroneal arteries up to the mid calf with no opacification seen below that level.   Post peripheral angiogram with :  1. Ultrasound-guided right groin access  2. Aortography, BLE angiography  3. Laser, IVUS, PTAS Left SFA with 6x150 Innova self expanding stent  4. Angioseal closure right CFA  Pt was initiated on asa/ plavix and lipitor     COPD/Tobacco dependence- tobacco cessation counseling was given. Will try nicotine patch while admitted     HLD  - on therapy    PTSD    DORIAN on ckd ?    Folate deficiency   - folic acid 1 mg po daily     Hx sarcomatoid carcinoma of left long    Hx bone spur removal left foot w chronic dorsal wound     Hypochromic microcytic anemia   - iron panel     VTE prophylaxis:    Patient condition:  Stable    Anticipated discharge and Disposition:   home       All diagnosis and  differential diagnosis have been reviewed; assessment and plan has been documented; I have personally reviewed the labs and test results that are presently available; I have reviewed the patients medication list; I have reviewed the consulting providers response and recommendations. I have reviewed or attempted to review medical records based upon their availability    All of the patient's questions have been  addressed and answered. Patient's is agreeable to the above stated plan. I will continue to monitor closely and make adjustments to medical management as needed.  _____________________________________________________________________    Nutrition Status:    Radiology:  I have personally reviewed the following imaging and agree with the radiologist.     Cardiac catheterization    The estimated blood loss was none.    The procedure log was documented by No documenter listed and verified by   Sami Giraldo MD.    Date: 5/11/2024  Time: 2:39 PM    Preprocedure diagnosis-critical limb ischemia  Postprocedure diagnosis-severe PAD  Estimated blood loss-10 cc  Tissue removal-none  Complications-none    Procedures performed:  1. Ultrasound-guided right groin access  2. Aortography, BLE angiography  3. Laser, IVUS, PTAS Left SFA with 6x150 Innova self expanding stent  4. Angioseal closure right CFA    Findings:  1. Distal aorta small, calcified, RCI 30%, LCI 60% calcified, 35 mmHg   gradient across Left iliac lesion  2. Right SFA, profunda, popliteal patent, 3 vessel runoff, small vessels,   90% calcified distal PT  3. LEFT % reduced to 0%, pop patent, 2 vessel runoff via PT,   peroneal, distal AT occlusion    Procedure detail:  Ultrasound-guided right groin access obtained.  Sheath inserted.  Femoral   angiography performed.  RLE angiography performed through the sheath.     Omniflush used for distal aortography.  Catheter and Glidewire used to   cross over to the level of the left CFA and exchanged out for a  6x45 cm   sheath.  Left lower extremity angiography performed.   in SFA crossed   with a glidewire and quick cross catheter.  A 6.0 filterwire was placed in   the distal SFA.  Laser atherectomy was performed with a 2.0 catheter,   multiple passes made.  PTA performed with a 6 x 220 balloon.  Repeat angio   was unsatisfactory.  IVUS was performed to visualized vessel size and   extent of disease.  This showed placque in the distal SFA and dissection   in the mid SFA.  Repeat PTA was performed.  Angio was still   unsatisfactory.  PTAS performed with a 6x150 innova self expanding stent,   postdilated with a 6.0 balloon.  Repeat angio was satisfactory with 2   vessel runoff to the foot.  Sheath was pulled back to the distal aorta and   a gradient was measured across the iliac lesion.  An angioseal was used   for the groin access site for hemostasis    Plan:  1. Maximize medical management  2. Smoking cessation  3. Pain control  4. Should be OK for DC in AM.  Iliac stenosis is moderate.  If wound is   slow to heal, can consider intervention to left AT and iliac as   outpatient.  This should not hold up patient's discharge.  5. Patient should have follow up with podiatry or wound care as outpatient  6. Continue DAPT      Oscar Forbes MD  Department of Hospital Medicine   [facility]   05/12/2024

## 2024-05-13 VITALS
HEART RATE: 77 BPM | RESPIRATION RATE: 18 BRPM | HEIGHT: 67 IN | SYSTOLIC BLOOD PRESSURE: 125 MMHG | BODY MASS INDEX: 22.11 KG/M2 | TEMPERATURE: 99 F | WEIGHT: 140.88 LBS | DIASTOLIC BLOOD PRESSURE: 76 MMHG | OXYGEN SATURATION: 97 %

## 2024-05-13 LAB
ABO + RH BLD: NORMAL
ABO + RH BLD: NORMAL
ANION GAP SERPL CALC-SCNC: 7 MEQ/L
BASOPHILS # BLD AUTO: 0.03 X10(3)/MCL
BASOPHILS NFR BLD AUTO: 0.3 %
BLD PROD TYP BPU: NORMAL
BLD PROD TYP BPU: NORMAL
BLOOD UNIT EXPIRATION DATE: NORMAL
BLOOD UNIT EXPIRATION DATE: NORMAL
BLOOD UNIT TYPE CODE: 5100
BLOOD UNIT TYPE CODE: 5100
BUN SERPL-MCNC: 12 MG/DL (ref 9.8–20.1)
CALCIUM SERPL-MCNC: 8.4 MG/DL (ref 8.4–10.2)
CHLORIDE SERPL-SCNC: 113 MMOL/L (ref 98–107)
CO2 SERPL-SCNC: 22 MMOL/L (ref 23–31)
CREAT SERPL-MCNC: 1.14 MG/DL (ref 0.55–1.02)
CREAT/UREA NIT SERPL: 11
CROSSMATCH INTERPRETATION: NORMAL
CROSSMATCH INTERPRETATION: NORMAL
DISPENSE STATUS: NORMAL
DISPENSE STATUS: NORMAL
EOSINOPHIL # BLD AUTO: 0.22 X10(3)/MCL (ref 0–0.9)
EOSINOPHIL NFR BLD AUTO: 2.5 %
ERYTHROCYTE [DISTWIDTH] IN BLOOD BY AUTOMATED COUNT: 18.3 % (ref 11.5–17)
GFR SERPLBLD CREATININE-BSD FMLA CKD-EPI: 50 ML/MIN/1.73/M2
GLUCOSE SERPL-MCNC: 105 MG/DL (ref 82–115)
HCT VFR BLD AUTO: 39.1 % (ref 37–47)
HEMATOLOGIST REVIEW: NORMAL
HGB BLD-MCNC: 13 G/DL (ref 12–16)
IMM GRANULOCYTES # BLD AUTO: 0.16 X10(3)/MCL (ref 0–0.04)
IMM GRANULOCYTES NFR BLD AUTO: 1.8 %
LYMPHOCYTES # BLD AUTO: 1.23 X10(3)/MCL (ref 0.6–4.6)
LYMPHOCYTES NFR BLD AUTO: 13.9 %
MAGNESIUM SERPL-MCNC: 1.6 MG/DL (ref 1.6–2.6)
MCH RBC QN AUTO: 34 PG (ref 27–31)
MCHC RBC AUTO-ENTMCNC: 33.2 G/DL (ref 33–36)
MCV RBC AUTO: 102.4 FL (ref 80–94)
MONOCYTES # BLD AUTO: 0.71 X10(3)/MCL (ref 0.1–1.3)
MONOCYTES NFR BLD AUTO: 8 %
NEUTROPHILS # BLD AUTO: 6.51 X10(3)/MCL (ref 2.1–9.2)
NEUTROPHILS NFR BLD AUTO: 73.5 %
NRBC BLD AUTO-RTO: 0 %
PLATELET # BLD AUTO: 137 X10(3)/MCL (ref 130–400)
PMV BLD AUTO: 10.6 FL (ref 7.4–10.4)
POTASSIUM SERPL-SCNC: 3.6 MMOL/L (ref 3.5–5.1)
RBC # BLD AUTO: 3.82 X10(6)/MCL (ref 4.2–5.4)
SODIUM SERPL-SCNC: 142 MMOL/L (ref 136–145)
UNIT NUMBER: NORMAL
UNIT NUMBER: NORMAL
WBC # SPEC AUTO: 8.86 X10(3)/MCL (ref 4.5–11.5)

## 2024-05-13 PROCEDURE — 25000003 PHARM REV CODE 250: Performed by: INTERNAL MEDICINE

## 2024-05-13 PROCEDURE — S4991 NICOTINE PATCH NONLEGEND: HCPCS | Performed by: INTERNAL MEDICINE

## 2024-05-13 PROCEDURE — 63600175 PHARM REV CODE 636 W HCPCS: Performed by: INTERNAL MEDICINE

## 2024-05-13 PROCEDURE — 80048 BASIC METABOLIC PNL TOTAL CA: CPT | Performed by: INTERNAL MEDICINE

## 2024-05-13 PROCEDURE — 25000003 PHARM REV CODE 250: Performed by: NURSE PRACTITIONER

## 2024-05-13 PROCEDURE — 25000003 PHARM REV CODE 250

## 2024-05-13 PROCEDURE — 83735 ASSAY OF MAGNESIUM: CPT | Performed by: INTERNAL MEDICINE

## 2024-05-13 PROCEDURE — 85025 COMPLETE CBC W/AUTO DIFF WBC: CPT | Performed by: INTERNAL MEDICINE

## 2024-05-13 PROCEDURE — 63600175 PHARM REV CODE 636 W HCPCS

## 2024-05-13 PROCEDURE — P9016 RBC LEUKOCYTES REDUCED: HCPCS | Performed by: NURSE PRACTITIONER

## 2024-05-13 PROCEDURE — 36415 COLL VENOUS BLD VENIPUNCTURE: CPT | Performed by: INTERNAL MEDICINE

## 2024-05-13 RX ORDER — POTASSIUM CHLORIDE 20 MEQ/1
40 TABLET, EXTENDED RELEASE ORAL ONCE
Status: COMPLETED | OUTPATIENT
Start: 2024-05-13 | End: 2024-05-13

## 2024-05-13 RX ORDER — FOLIC ACID 1 MG/1
1 TABLET ORAL DAILY
COMMUNITY
Start: 2024-05-14 | End: 2024-06-13

## 2024-05-13 RX ORDER — ASPIRIN 81 MG/1
81 TABLET ORAL DAILY
COMMUNITY
Start: 2024-05-14 | End: 2025-05-14

## 2024-05-13 RX ORDER — MUPIROCIN 20 MG/G
OINTMENT TOPICAL 2 TIMES DAILY
Status: DISCONTINUED | OUTPATIENT
Start: 2024-05-13 | End: 2024-05-13 | Stop reason: HOSPADM

## 2024-05-13 RX ORDER — FUROSEMIDE 10 MG/ML
20 INJECTION INTRAMUSCULAR; INTRAVENOUS ONCE
Status: COMPLETED | OUTPATIENT
Start: 2024-05-13 | End: 2024-05-13

## 2024-05-13 RX ORDER — ATORVASTATIN CALCIUM 40 MG/1
40 TABLET, FILM COATED ORAL DAILY
Qty: 30 TABLET | Refills: 0 | Status: SHIPPED | OUTPATIENT
Start: 2024-05-14 | End: 2024-06-13

## 2024-05-13 RX ORDER — CLOPIDOGREL BISULFATE 75 MG/1
75 TABLET ORAL DAILY
Qty: 30 TABLET | Refills: 0 | Status: SHIPPED | OUTPATIENT
Start: 2024-05-14 | End: 2024-06-13

## 2024-05-13 RX ORDER — MAGNESIUM SULFATE HEPTAHYDRATE 40 MG/ML
4 INJECTION, SOLUTION INTRAVENOUS ONCE
Status: COMPLETED | OUTPATIENT
Start: 2024-05-13 | End: 2024-05-13

## 2024-05-13 RX ADMIN — HYDRALAZINE HYDROCHLORIDE 10 MG: 20 INJECTION INTRAMUSCULAR; INTRAVENOUS at 01:05

## 2024-05-13 RX ADMIN — MUPIROCIN: 20 OINTMENT TOPICAL at 08:05

## 2024-05-13 RX ADMIN — ATORVASTATIN CALCIUM 40 MG: 40 TABLET, FILM COATED ORAL at 08:05

## 2024-05-13 RX ADMIN — FOLIC ACID 1 MG: 1 TABLET ORAL at 08:05

## 2024-05-13 RX ADMIN — POTASSIUM CHLORIDE 40 MEQ: 1500 TABLET, EXTENDED RELEASE ORAL at 08:05

## 2024-05-13 RX ADMIN — ASPIRIN 81 MG: 81 TABLET, COATED ORAL at 08:05

## 2024-05-13 RX ADMIN — HYDROCODONE BITARTRATE AND ACETAMINOPHEN 1 TABLET: 5; 325 TABLET ORAL at 08:05

## 2024-05-13 RX ADMIN — LISINOPRIL 5 MG: 5 TABLET ORAL at 08:05

## 2024-05-13 RX ADMIN — ATENOLOL 25 MG: 25 TABLET ORAL at 08:05

## 2024-05-13 RX ADMIN — CLOPIDOGREL BISULFATE 75 MG: 75 TABLET ORAL at 08:05

## 2024-05-13 RX ADMIN — NICOTINE 1 PATCH: 21 PATCH, EXTENDED RELEASE TRANSDERMAL at 08:05

## 2024-05-13 RX ADMIN — MAGNESIUM SULFATE HEPTAHYDRATE 4 G: 40 INJECTION, SOLUTION INTRAVENOUS at 08:05

## 2024-05-13 RX ADMIN — FUROSEMIDE 20 MG: 10 INJECTION, SOLUTION INTRAMUSCULAR; INTRAVENOUS at 01:05

## 2024-05-13 NOTE — PLAN OF CARE
05/13/24 1009   Discharge Assessment   Assessment Type Discharge Planning Assessment   Confirmed/corrected address, phone number and insurance Yes   Confirmed Demographics Correct on Facesheet   Source of Information patient   When was your last doctors appointment? 05/09/24   Does patient/caregiver understand observation status Yes   Communicated NANDINI with patient/caregiver Yes   Reason For Admission PAD   People in Home alone   Facility Arrived From: [facility]   Do you expect to return to your current living situation? Yes   Do you have help at home or someone to help you manage your care at home? Yes   Who are your caregiver(s) and their phone number(s)? reports friends and family can help if needed   Prior to hospitilization cognitive status: Alert/Oriented   Current cognitive status: Alert/Oriented   Walking or Climbing Stairs Difficulty yes   Walking or Climbing Stairs ambulation difficulty, requires equipment   Mobility Management uses cane, walker , knee scooter as needed   Dressing/Bathing Difficulty no   Home Accessibility wheelchair accessible   Home Layout Able to live on 1st floor   Equipment Currently Used at Home cane, straight;walker, rolling   Do you currently have service(s) that help you manage your care at home? Yes   Name and Contact number of agency reports agency called [facility]   Is the pt/caregiver preference to resume services with current agency Yes   Do you take prescription medications? Yes   Do you have any problems affording any of your prescribed medications? No   Is the patient taking medications as prescribed? yes   Who is going to help you get home at discharge? friends, fmly   How do you get to doctors appointments? car, drives self   Are you on dialysis? No   Do you take coumadin? No   Discharge Plan A Home Health   Discharge Plan B Home Health   DME Needed Upon Discharge  none   Discharge Plan discussed with: Patient   Transition of Care Barriers None    Housing Stability   In the last 12 months, was there a time when you were not able to pay the mortgage or rent on time? N   At any time in the past 12 months, were you homeless or living in a shelter (including now)? N   Transportation Needs   Has the lack of transportation kept you from medical appointments, meetings, work or from getting things needed for daily living? No   Food Insecurity   Within the past 12 months, you worried that your food would run out before you got the money to buy more. Never true   Within the past 12 months, the food you bought just didn't last and you didn't have money to get more. Never true   Utilities   In the past 12 months has the electric, gas, oil, or water company threatened to shut off services in your home? No   Health Literacy   How often do you need to have someone help you when you read instructions, pamphlets, or other written material from your doctor or pharmacy? Sometimes     Pt came from [facility]. She has HH called [facility] -in her area. She uses cane, walker, knee scooter. Other needs TBD.

## 2024-05-13 NOTE — PLAN OF CARE
[facility] Bo Pt is current with [facility]. She reports that they do use [facility] . Sending AVS, dc info now

## 2024-05-13 NOTE — PLAN OF CARE
05/13/24 1339   Final Note   Assessment Type Final Discharge Note   Anticipated Discharge Disposition Home-Health   Post-Acute Status   Post-Acute Authorization Home Health   Home Health Status Set-up Complete/Auth obtained   Discharge Delays None known at this time     Pt will dc to home with  [facility] . [employer] will transport home

## 2024-05-13 NOTE — PLAN OF CARE
Pt does not know HH name . She gave me a number to HH nurse and no one has answered.     Informed pt I could not reach Radha and I will need to set up new HH if she can't provide a name of agency . She is calling her pcp office to obtain info    Foc hh list given

## 2024-05-13 NOTE — DISCHARGE SUMMARY
[facility] HOSPITAL MEDICINE - DISCHARGE SUMMARY    Patient Name: Zita Read  MRN: [id number]  Admission Date: 5/10/2024  Discharge Date: 05/13/2024  Hospital Length of Stay: 3 days  Discharge Provider: Marin Beard MD  Primary Care Provider: Gilberto Flores III, MD      HOSPITAL COURSE   Ms. Read is a 72y/o female, unknown to [facility], who was transferred from [facility] for vascular services due to BLE pain L>R. Feet were cool with discolored toes. CTA abdomen/pelvis with runoff obtained revealing occlusion of most of Left femoral artery with reconstitution from profunda collateral vessels at the distal femoral artery into the popliteal artery with diffuse narrowing of remaining distal L femoral and popliteal arteries. 3V runoff to mid calf with suboptimal opacification of L AT artery and PT artery at the level of the ankle. RLE revealing contrast opacifiation of R AT, PT and peroneal arteries up to the mid calf with no opacification seen below that level.  She has been placed on a heparin drip and admitted to hospital medicine. [facility] consulted due to PAD  ** BLE have cool feet. + numbness/tingling with ischemic changes to left big toe and left pinkie. + monophasic doppler PT pulses BLE and DP pulse to R foot. No pulse noted to  L DP. Appears to be chronic limb ischemia        Pt is post peripheral angiogram   Findings:  1. Distal aorta small, calcified, RCI 30%, LCI 60% calcified, 35 mmHg gradient across Left iliac lesion  2. Right SFA, profunda, popliteal patent, 3 vessel runoff, small vessels, 90% calcified distal PT  3. LEFT % reduced to 0%, pop patent, 2 vessel runoff via PT, peroneal, distal AT occlusion        Procedures performed:  1. Ultrasound-guided right groin access  2. Aortography, BLE angiography  3. Laser, IVUS, PTAS Left SFA with 6x150 Innova self expanding stent  4. Angioseal closure right CFA     Placed on asa/plavix and lipitor    Did develop  hematoma over the right grion site however US showed no pseudoaneurysm.  Had to require blood transfusion.    At this time has been cleared for discharge by [facility] cardiology with plans for asa plavix statin.  Outpatient follow up with her primary cardiologist.  Needs to stop smoking, she understands.  Has a chronic wound, continue with local wound care.  Home health to be set up.          PHYSICAL EXAM     Most Recent Vital Signs:  Temp: 98.6 °F (37 °C) (05/13/24 1115)  Pulse: 77 (05/13/24 1115)  Resp: 18 (05/13/24 1115)  BP: 125/76 (05/13/24 1115)  SpO2: 97 % (05/13/24 1115)   GENERAL: In no acute distress, afebrile  HEENT:  CHEST: Clear to auscultation bilaterally  HEART: S1, S2, no appreciable murmur  ABDOMEN: Soft, nontender, BS +  MSK: Warm, no lower extremity edema, no clubbing or cyanosis  NEUROLOGIC: Alert and oriented x4, moving all extremities with good strength   INTEGUMENTARY:   PSYCHIATRY:          DISCHARGE DIAGNOSIS   Bilateral PAD with Left CLI   COPD/Tobacco dependence   HLD  PTSD  DORIAN on CKD  Folate deficiency        _____________________________________________________________________________      DISCHARGE MED REC     Discharge Medication List as of 5/13/2024  2:03 PM        START taking these medications    Details   aspirin (ECOTRIN) 81 MG EC tablet Take 1 tablet (81 mg total) by mouth once daily., Starting Tue 5/14/2024, Until Wed 5/14/2025, OTC      atorvastatin (LIPITOR) 40 MG tablet Take 1 tablet (40 mg total) by mouth once daily., Starting Tue 5/14/2024, Until Thu 6/13/2024, Normal      clopidogreL (PLAVIX) 75 mg tablet Take 1 tablet (75 mg total) by mouth once daily., Starting Tue 5/14/2024, Until Thu 6/13/2024, Normal      folic acid (FOLVITE) 1 MG tablet Take 1 tablet (1 mg total) by mouth once daily., Starting Tue 5/14/2024, Until Thu 6/13/2024, OTC           CONTINUE these medications which have NOT CHANGED    Details   albuterol (VENTOLIN HFA) 90 mcg/actuation inhaler Inhale 1 puff  into the lungs every 4 (four) hours as needed for Wheezing. Rescue, Historical Med      atenoloL (TENORMIN) 25 MG tablet Take 25 mg by mouth once daily., Historical Med      diazePAM (VALIUM) 5 MG tablet Take 5 mg by mouth every evening., Historical Med      fluticasone propionate (FLOVENT DISKUS) 50 mcg/actuation DsDv Inhale 50 mcg into the lungs Daily. Controller, Historical Med      fluticasone-umeclidin-vilanter (TRELEGY ELLIPTA) 200-62.5-25 mcg inhaler Inhale 1 puff into the lungs once daily., Historical Med      furosemide (LASIX) 40 MG tablet Take 40 mg by mouth once daily., Historical Med      gabapentin (NEURONTIN) 300 MG capsule Take 300 mg by mouth once daily., Historical Med      HYDROcodone-acetaminophen (NORCO) 7.5-325 mg per tablet Take 1 tablet by mouth every 12 (twelve) hours as needed for Pain., Historical Med      lisinopriL (PRINIVIL,ZESTRIL) 5 MG tablet Take 5 mg by mouth once daily., Historical Med      mirtazapine (REMERON) 30 MG tablet Take 30 mg by mouth every evening., Historical Med      montelukast (SINGULAIR) 10 mg tablet Take 10 mg by mouth once daily., Historical Med      potassium chloride (MICRO-K) 10 MEQ CpSR Take 10 mEq by mouth once daily., Historical Med      QUEtiapine (SEROQUEL) 25 MG Tab Take 25 mg by mouth every evening., Historical Med           STOP taking these medications       oxybutynin (DITROPAN) 5 MG Tab Comments:   Reason for Stopping:                  CONSULTS     Consults (From admission, onward)          Status Ordering Provider     Inpatient consult to Cardiology  Once        Provider:  Sami Giraldo MD    Completed THOMAS SÁNCHEZ              FOLLOW UP      Follow-up Information       Farooq Robertson MD Follow up.    Specialty: Cardiology  Why: The office will call the patient with an appointment date and time.  Contact information:  [address]  [place] [id number] [phone]               Gilberto Flores III, MD Follow  up.    Specialty: Internal Medicine  Why: The office will call the patient with an appointment date and time.  Contact information:  [address] [place] [address]  [place] [address]  [phone]               [facility] [address] [place] [address] Follow up.    Specialties: Home Health Services, Home Therapy Services, Home Living Aide Services  Why: home health will call you to schedule home visit  Contact information:  [address] [address]  [phone]                               DISCHARGE INSTRUCTIONS     Explained in detail to the patient about the discharge plan, medications, and follow-up visits. Pt understands and agrees with the treatment plan.  Discharged Condition: stable  Diet as tolerated  Activities as tolerated  Discharge to: Home-Health Care [facility]    TIME SPENT ON DISCHARGE   35 minutes        Marin Beard MD  Internal Medicine  Department of Hospital Medicine  [facility]      This document was created using electronic dictation services.  Please excuse any errors that may have been made.  Contact me if any questions regarding documentation to clarify verbiage.

## 2024-05-13 NOTE — PROGRESS NOTES
[facility] - [facility] Telemetry    Cardiology  Progress Note    Patient Name: Zita Read  MRN: [id number]  Admission Date: 5/10/2024  Hospital Length of Stay: 3 days  Code Status: Full Code   Attending Provider: Oscar Forbes MD   Consulting Provider: LINDA Kulkarni  Primary Care Physician: Gilberto Flores III, MD  Principal Problem:Peripheral arterial disease    Patient information was obtained from patient, past medical records, and ER records.     Subjective:     Chief Complaint/Reason for Consult: PAD    HPI:   Ms. Read is a 72y/o female, unknown to [facility], who was transferred from [facility] for vascular services due to BLE pain L>R. Feet were cool with discolored toes. CTA abdomen/pelvis with runoff obtained revealing occlusion of most of Left femoral artery with reconstitution from profunda collateral vessels at the distal femoral artery into the popliteal artery with diffuse narrowing of remaining distal L femoral and popliteal arteries. 3V runoff to mid calf with suboptimal opacification of L AT artery and PT artery at the level of the ankle. RLE revealing contrast opacifiation of R AT, PT and peroneal arteries up to the mid calf with no opacification seen below that level.  She has been placed on a heparin drip and admitted to hospital medicine. [facility] consulted due to PAD  ** BLE have cool feet. + numbness/tingling with ischemic changes to left big toe and left pinkie. + monophasic doppler PT pulses BLE and DP pulse to R foot. No pulse noted to  L DP. Appears to be chronic limb ischemia    Hospital Course:  5.11.24: s/p Peripheral angiogram with laser atherectomy, PTA, self expanding stent with 0% residual, 2 vessel runoff via PT and peroneal. RLE noted with patent RSFA, profunda, popliteal artery with 3 vessel runoff and 90% distal AT occlusion.   5.12.24: Patient felt a pop in her groin this morning and developed a large hematoma. Nurse is at bedside applying manual  pressure. BP stable.   5.13.24: NAD. H&H stable today. RLE arterial US (-) for pseudo. SR on tele. BP stable. On 3 L NC. H&H stable. Tenderness to right groin but soft.     PMH: HLD, COPD, PTSD, sarcomatoid carcinoma left lung, left foot bone spur removal 02/2024  PSH: hysterectomy with BSO, cholecystectomy, vertebroplasty, colonoscopy/EGD with dilatation, L lung lobectomy  Family History: father- prostate cancer; mother- HTN,Renal failure, DM2  Social History: current smoker    Previous Cardiac Diagnostics:   RLE Arterial US 5.12.24:  No pseudoaneurysm was found in the right common femoral artery.  No evidence of pseudoaneurysm or localized hematoma in the right groin.  Patent right common through mid femoral arteries.    Peripheral angiogram 05.11.24:   Distal aorta small, calcified, RCI 30%, LCI 60% calcified, 35 mmHg gradient across Left iliac lesion  2. Right SFA, profunda, popliteal patent, 3 vessel runoff, small vessels, 90% calcified distal PT  3. LEFT % reduced to 0%, pop patent, 2 vessel runoff via PT, peroneal, distal AT occlusion    No past medical history on file.  No past surgical history on file.  Review of patient's allergies indicates:   Allergen Reactions    Levaquin [levofloxacin]     Lithium analogues     Pcn [penicillins]        Review of Systems   Constitutional: Negative.    Respiratory: Negative.  Negative for shortness of breath.    Cardiovascular: Negative.  Negative for chest pain and palpitations.   Gastrointestinal: Negative.    Musculoskeletal:         Right groin pain   Skin:  Positive for color change.       Objective:     Vital Signs (Most Recent):  Temp: 99.7 °F (37.6 °C) (05/13/24 0742)  Pulse: (!) 118 (05/13/24 0743)  Resp: 18 (05/13/24 0836)  BP: (!) 147/71 (05/13/24 0743)  SpO2: 98 % (05/13/24 0742) Vital Signs (24h Range):  Temp:  [97.8 °F (36.6 °C)-99.7 °F (37.6 °C)] 99.7 °F (37.6 °C)  Pulse:  [] 118  Resp:  [17-20] 18  SpO2:  [98 %-100 %] 98 %  BP:  ()/(70-79) 147/71   Weight: 63.9 kg (140 lb 14 oz)  Body mass index is 22.06 kg/m².  SpO2: 98 %       Intake/Output Summary (Last 24 hours) at 5/13/2024 0924  Last data filed at 5/13/2024 0635  Gross per 24 hour   Intake 480 ml   Output 1575 ml   Net -1095 ml     Lines/Drains/Airways       Peripheral Intravenous Line  Duration                  Peripheral IV - Single Lumen 20 G Right Antecubital -- days                  Significant Labs:  Recent Results (from the past 72 hour(s))   Comprehensive metabolic panel    Collection Time: 05/10/24  9:52 PM   Result Value Ref Range    Sodium 144 136 - 145 mmol/L    Potassium 3.8 3.5 - 5.1 mmol/L    Chloride 116 (H) 98 - 107 mmol/L    CO2 20 (L) 23 - 31 mmol/L    Glucose 90 82 - 115 mg/dL    Blood Urea Nitrogen 18.6 9.8 - 20.1 mg/dL    Creatinine 1.55 (H) 0.55 - 1.02 mg/dL    Calcium 8.2 (L) 8.4 - 10.2 mg/dL    Protein Total 5.2 (L) 5.8 - 7.6 gm/dL    Albumin 3.0 (L) 3.4 - 4.8 g/dL    Globulin 2.2 (L) 2.4 - 3.5 gm/dL    Albumin/Globulin Ratio 1.4 1.1 - 2.0 ratio    Bilirubin Total 0.4 <=1.5 mg/dL    ALP 60 40 - 150 unit/L    ALT 13 0 - 55 unit/L    AST 18 5 - 34 unit/L    eGFR 35 mL/min/1.73/m2   APTT    Collection Time: 05/10/24  9:52 PM   Result Value Ref Range    .4 (H) 23.2 - 33.7 seconds   Protime-INR    Collection Time: 05/10/24  9:52 PM   Result Value Ref Range    PT 13.7 12.5 - 14.5 seconds    INR 1.1 <=1.3   CBC with Differential    Collection Time: 05/10/24  9:52 PM   Result Value Ref Range    WBC 8.82 4.50 - 11.50 x10(3)/mcL    RBC 2.90 (L) 4.20 - 5.40 x10(6)/mcL    Hgb 10.3 (L) 12.0 - 16.0 g/dL    Hct 31.3 (L) 37.0 - 47.0 %    .9 (H) 80.0 - 94.0 fL    MCH 35.5 (H) 27.0 - 31.0 pg    MCHC 32.9 (L) 33.0 - 36.0 g/dL    RDW 15.4 11.5 - 17.0 %    Platelet 209 130 - 400 x10(3)/mcL    MPV 10.5 (H) 7.4 - 10.4 fL    Neut % 53.8 %    Lymph % 37.6 %    Mono % 6.3 %    Eos % 1.4 %    Basophil % 0.2 %    Lymph # 3.32 0.6 - 4.6 x10(3)/mcL    Neut # 4.74 2.1 -  9.2 x10(3)/mcL    Mono # 0.56 0.1 - 1.3 x10(3)/mcL    Eos # 0.12 0 - 0.9 x10(3)/mcL    Baso # 0.02 <=0.2 x10(3)/mcL    IG# 0.06 (H) 0 - 0.04 x10(3)/mcL    IG% 0.7 %    NRBC% 0.0 %   APTT    Collection Time: 05/11/24  4:05 AM   Result Value Ref Range    PTT 64.8 (H) 23.2 - 33.7 seconds   Comprehensive Metabolic Panel (CMP)    Collection Time: 05/11/24  4:05 AM   Result Value Ref Range    Sodium 145 136 - 145 mmol/L    Potassium 4.0 3.5 - 5.1 mmol/L    Chloride 116 (H) 98 - 107 mmol/L    CO2 21 (L) 23 - 31 mmol/L    Glucose 132 (H) 82 - 115 mg/dL    Blood Urea Nitrogen 20.1 9.8 - 20.1 mg/dL    Creatinine 1.51 (H) 0.55 - 1.02 mg/dL    Calcium 8.3 (L) 8.4 - 10.2 mg/dL    Protein Total 5.4 (L) 5.8 - 7.6 gm/dL    Albumin 2.8 (L) 3.4 - 4.8 g/dL    Globulin 2.6 2.4 - 3.5 gm/dL    Albumin/Globulin Ratio 1.1 1.1 - 2.0 ratio    Bilirubin Total 0.3 <=1.5 mg/dL    ALP 61 40 - 150 unit/L    ALT 13 0 - 55 unit/L    AST 14 5 - 34 unit/L    eGFR 36 mL/min/1.73/m2   Magnesium    Collection Time: 05/11/24  4:05 AM   Result Value Ref Range    Magnesium Level 1.70 1.60 - 2.60 mg/dL   Phosphorus    Collection Time: 05/11/24  4:05 AM   Result Value Ref Range    Phosphorus Level 3.5 2.3 - 4.7 mg/dL   CBC with Differential    Collection Time: 05/11/24  4:05 AM   Result Value Ref Range    WBC 7.98 4.50 - 11.50 x10(3)/mcL    RBC 3.13 (L) 4.20 - 5.40 x10(6)/mcL    Hgb 11.1 (L) 12.0 - 16.0 g/dL    Hct 33.8 (L) 37.0 - 47.0 %    .0 (H) 80.0 - 94.0 fL    MCH 35.5 (H) 27.0 - 31.0 pg    MCHC 32.8 (L) 33.0 - 36.0 g/dL    RDW 15.4 11.5 - 17.0 %    Platelet 191 130 - 400 x10(3)/mcL    MPV 10.8 (H) 7.4 - 10.4 fL    Neut % 51.4 %    Lymph % 38.8 %    Mono % 7.0 %    Eos % 1.5 %    Basophil % 0.4 %    Lymph # 3.10 0.6 - 4.6 x10(3)/mcL    Neut # 4.10 2.1 - 9.2 x10(3)/mcL    Mono # 0.56 0.1 - 1.3 x10(3)/mcL    Eos # 0.12 0 - 0.9 x10(3)/mcL    Baso # 0.03 <=0.2 x10(3)/mcL    IG# 0.07 (H) 0 - 0.04 x10(3)/mcL    IG% 0.9 %    NRBC% 0.0 %   Hemoglobin A1C     Collection Time: 05/12/24  5:16 AM   Result Value Ref Range    Hemoglobin A1c 5.5 <=7.0 %    Estimated Average Glucose 111.2 mg/dL   Folate    Collection Time: 05/12/24  5:17 AM   Result Value Ref Range    Folate Level 4.4 (L) 7.0 - 31.4 ng/mL   Lipid Panel    Collection Time: 05/12/24  5:17 AM   Result Value Ref Range    Cholesterol Total 156 <=200 mg/dL    HDL Cholesterol 24 (L) 35 - 60 mg/dL    Triglyceride 144 (H) 37 - 140 mg/dL    Cholesterol/HDL Ratio 7 (H) 0 - 5    Very Low Density Lipoprotein 29     LDL Cholesterol 103.00 50.00 - 140.00 mg/dL   Basic Metabolic Panel    Collection Time: 05/12/24  5:17 AM   Result Value Ref Range    Sodium 144 136 - 145 mmol/L    Potassium 4.0 3.5 - 5.1 mmol/L    Chloride 118 (H) 98 - 107 mmol/L    CO2 20 (L) 23 - 31 mmol/L    Glucose 120 (H) 82 - 115 mg/dL    Blood Urea Nitrogen 15.2 9.8 - 20.1 mg/dL    Creatinine 1.31 (H) 0.55 - 1.02 mg/dL    BUN/Creatinine Ratio 12     Calcium 7.8 (L) 8.4 - 10.2 mg/dL    Anion Gap 6.0 mEq/L    eGFR 43 mL/min/1.73/m2   Magnesium    Collection Time: 05/12/24  5:17 AM   Result Value Ref Range    Magnesium Level 1.60 1.60 - 2.60 mg/dL   Phosphorus    Collection Time: 05/12/24  5:17 AM   Result Value Ref Range    Phosphorus Level 2.4 2.3 - 4.7 mg/dL   Iron and TIBC    Collection Time: 05/12/24  7:19 AM   Result Value Ref Range    Iron Binding Capacity Unsaturated 151 70 - 310 ug/dL    Iron Level 23 (L) 50 - 170 ug/dL    Transferrin 157 (L) 173 - 360 mg/dL    Iron Binding Capacity Total 174 (L) 250 - 450 ug/dL    Iron Saturation 13 (L) 20 - 50 %   CBC with Differential    Collection Time: 05/12/24  9:41 AM   Result Value Ref Range    WBC 9.07 4.50 - 11.50 x10(3)/mcL    RBC 2.81 (L) 4.20 - 5.40 x10(6)/mcL    Hgb 9.7 (L) 12.0 - 16.0 g/dL    Hct 29.9 (L) 37.0 - 47.0 %    .4 (H) 80.0 - 94.0 fL    MCH 34.5 (H) 27.0 - 31.0 pg    MCHC 32.4 (L) 33.0 - 36.0 g/dL    RDW 15.5 11.5 - 17.0 %    Platelet 202 130 - 400 x10(3)/mcL    MPV 10.6 (H) 7.4 -  10.4 fL    Neut % 80.3 %    Lymph % 9.9 %    Mono % 6.3 %    Eos % 2.0 %    Basophil % 0.1 %    Lymph # 0.90 0.6 - 4.6 x10(3)/mcL    Neut # 7.28 2.1 - 9.2 x10(3)/mcL    Mono # 0.57 0.1 - 1.3 x10(3)/mcL    Eos # 0.18 0 - 0.9 x10(3)/mcL    Baso # 0.01 <=0.2 x10(3)/mcL    IG# 0.13 (H) 0 - 0.04 x10(3)/mcL    IG% 1.4 %    NRBC% 0.0 %   Hemoglobin and Hematocrit    Collection Time: 05/12/24  3:06 PM   Result Value Ref Range    Hgb 8.6 (L) 12.0 - 16.0 g/dL    Hct 26.7 (L) 37.0 - 47.0 %   Hemoglobin and Hematocrit    Collection Time: 05/12/24  8:08 PM   Result Value Ref Range    Hgb 8.7 (L) 12.0 - 16.0 g/dL    Hct 27.7 (L) 37.0 - 47.0 %   Prepare RBC 2 Units; bleeding    Collection Time: 05/12/24  8:08 PM   Result Value Ref Range    UNIT NUMBER [id number]     UNIT ABO/RH O POS     DISPENSE STATUS Transfused     Unit Expiration [id number]     Product Code Y8653L73     Unit Blood Type Code 5100     CROSSMATCH INTERPRETATION Compatible     UNIT NUMBER [id number]     UNIT ABO/RH O POS     DISPENSE STATUS Issued     Unit Expiration [id number]     Product Code G7534S91     Unit Blood Type Code 5100     CROSSMATCH INTERPRETATION Compatible    Type & Screen    Collection Time: 05/12/24  8:08 PM   Result Value Ref Range    Group & Rh O POS     Indirect Atul GEL NEG     Specimen Outdate 05/15/2024 23:59    ABORH RETYPE    Collection Time: 05/12/24  8:41 PM   Result Value Ref Range    ABORH Retype O POS    Basic Metabolic Panel    Collection Time: 05/13/24  6:43 AM   Result Value Ref Range    Sodium 142 136 - 145 mmol/L    Potassium 3.6 3.5 - 5.1 mmol/L    Chloride 113 (H) 98 - 107 mmol/L    CO2 22 (L) 23 - 31 mmol/L    Glucose 105 82 - 115 mg/dL    Blood Urea Nitrogen 12.0 9.8 - 20.1 mg/dL    Creatinine 1.14 (H) 0.55 - 1.02 mg/dL    BUN/Creatinine Ratio 11     Calcium 8.4 8.4 - 10.2 mg/dL    Anion Gap 7.0 mEq/L    eGFR 50 mL/min/1.73/m2   Magnesium    Collection Time: 05/13/24  6:43 AM   Result Value Ref Range     Magnesium Level 1.60 1.60 - 2.60 mg/dL   CBC with Differential    Collection Time: 05/13/24  6:43 AM   Result Value Ref Range    WBC 8.86 4.50 - 11.50 x10(3)/mcL    RBC 3.82 (L) 4.20 - 5.40 x10(6)/mcL    Hgb 13.0 12.0 - 16.0 g/dL    Hct 39.1 37.0 - 47.0 %    .4 (H) 80.0 - 94.0 fL    MCH 34.0 (H) 27.0 - 31.0 pg    MCHC 33.2 33.0 - 36.0 g/dL    RDW 18.3 (H) 11.5 - 17.0 %    Platelet 137 130 - 400 x10(3)/mcL    MPV 10.6 (H) 7.4 - 10.4 fL    Neut % 73.5 %    Lymph % 13.9 %    Mono % 8.0 %    Eos % 2.5 %    Basophil % 0.3 %    Lymph # 1.23 0.6 - 4.6 x10(3)/mcL    Neut # 6.51 2.1 - 9.2 x10(3)/mcL    Mono # 0.71 0.1 - 1.3 x10(3)/mcL    Eos # 0.22 0 - 0.9 x10(3)/mcL    Baso # 0.03 <=0.2 x10(3)/mcL    IG# 0.16 (H) 0 - 0.04 x10(3)/mcL    IG% 1.8 %    NRBC% 0.0 %     Significant Imaging:  Imaging Results    None       Physical Exam  HENT:      Head: Normocephalic.      Nose: Nose normal.      Mouth/Throat:      Mouth: Mucous membranes are dry.   Eyes:      Extraocular Movements: Extraocular movements intact.   Cardiovascular:      Rate and Rhythm: Normal rate and regular rhythm.      Pulses: Normal pulses.      Heart sounds: Normal heart sounds.   Pulmonary:      Effort: Pulmonary effort is normal.      Breath sounds: Normal breath sounds.   Abdominal:      Palpations: Abdomen is soft.   Musculoskeletal:         General: Normal range of motion.   Skin:     General: Skin is warm.      Comments: Right groin with tenderness. Soft, no hematoma noted. + 2 peripheral pulse  Scar to left proximal foot due to bone spur removal   Neurological:      General: No focal deficit present.      Mental Status: She is alert and oriented to person, place, and time.   Psychiatric:         Mood and Affect: Mood normal.         Behavior: Behavior normal.       Home Medications:   No current facility-administered medications on file prior to encounter.     Current Outpatient Medications on File Prior to Encounter   Medication Sig Dispense  Refill    albuterol (VENTOLIN HFA) 90 mcg/actuation inhaler Inhale 1 puff into the lungs every 4 (four) hours as needed for Wheezing. Rescue      atenoloL (TENORMIN) 25 MG tablet Take 25 mg by mouth once daily.      diazePAM (VALIUM) 5 MG tablet Take 5 mg by mouth every evening.      fluticasone propionate (FLOVENT DISKUS) 50 mcg/actuation DsDv Inhale 50 mcg into the lungs Daily. Controller      fluticasone-umeclidin-vilanter (TRELEGY ELLIPTA) 200-62.5-25 mcg inhaler Inhale 1 puff into the lungs once daily.      furosemide (LASIX) 40 MG tablet Take 40 mg by mouth once daily.      gabapentin (NEURONTIN) 300 MG capsule Take 300 mg by mouth once daily.      HYDROcodone-acetaminophen (NORCO) 7.5-325 mg per tablet Take 1 tablet by mouth every 12 (twelve) hours as needed for Pain.      lisinopriL (PRINIVIL,ZESTRIL) 5 MG tablet Take 5 mg by mouth once daily.      mirtazapine (REMERON) 30 MG tablet Take 30 mg by mouth every evening.      montelukast (SINGULAIR) 10 mg tablet Take 10 mg by mouth once daily.      oxybutynin (DITROPAN) 5 MG Tab Take 5 mg by mouth 2 (two) times daily.      potassium chloride (MICRO-K) 10 MEQ CpSR Take 10 mEq by mouth once daily.      QUEtiapine (SEROQUEL) 25 MG Tab Take 25 mg by mouth every evening.       Current Inpatient Medications:    Current Facility-Administered Medications:     0.9%  NaCl infusion (for blood administration), , Intravenous, Q24H PRN, Ananya Kelley FNP    acetaminophen tablet 650 mg, 650 mg, Oral, Q6H PRN, Valeri Diana AGACNP-BC, 650 mg at 05/12/24 0153    aluminum-magnesium hydroxide-simethicone 200-200-20 mg/5 mL suspension 30 mL, 30 mL, Oral, QID PRN, Valeri Diana AGACNP-BC    aspirin EC tablet 81 mg, 81 mg, Oral, Daily, Ananya Kelley FNP, 81 mg at 05/13/24 0829    atenoloL tablet 25 mg, 25 mg, Oral, Daily, Ananya Kelley FNP, 25 mg at 05/13/24 0829    atorvastatin tablet 40 mg, 40 mg, Oral, Daily, Ananya Kelley FNP, 40 mg at 05/13/24  0829    clopidogreL tablet 75 mg, 75 mg, Oral, Daily, Ananya Kelley, FNP, 75 mg at 05/13/24 0829    folic acid tablet 1 mg, 1 mg, Oral, Daily, Oscar Forbes MD, 1 mg at 05/13/24 0829    hydrALAZINE injection 10 mg, 10 mg, Intravenous, Q4H PRN, Sami Giraldo MD, 10 mg at 05/13/24 0146    HYDROcodone-acetaminophen 5-325 mg per tablet 1 tablet, 1 tablet, Oral, Q4H PRN, Sami Giraldo MD, 1 tablet at 05/13/24 0836    lactated ringers infusion, , Intravenous, Continuous, Valeri Diana AGACNP-BC, Last Rate: 75 mL/hr at 05/11/24 0533, New Bag at 05/11/24 0533    lisinopriL tablet 5 mg, 5 mg, Oral, Daily, Ananya Kelley FNP, 5 mg at 05/13/24 0829    magnesium sulfate 2g in water 50mL IVPB (premix), 4 g, Intravenous, Once, Sherie Adams FNP, Last Rate: 50 mL/hr at 05/13/24 0849, 4 g at 05/13/24 0849    melatonin tablet 6 mg, 6 mg, Oral, Nightly PRN, Valeri Diana AGACNP-BC    morphine injection 2 mg, 2 mg, Intravenous, Q4H PRN, Sami Giraldo MD, 2 mg at 05/12/24 0937    mupirocin 2 % ointment, , Nasal, BID, Oscar Forbes MD, Given at 05/13/24 0849    naloxone 0.4 mg/mL injection 0.02 mg, 0.02 mg, Intravenous, PRN, Valeri Diana AGACNP-BC    nicotine 21 mg/24 hr 1 patch, 1 patch, Transdermal, Daily, Oscar Forbes MD, 1 patch at 05/13/24 0829    ondansetron injection 4 mg, 4 mg, Intravenous, Q4H PRN, Valeri Diana AGACNP-BC    polyethylene glycol packet 17 g, 17 g, Oral, BID PRN, Valeri Diana AGACNP-BC    prochlorperazine injection Soln 5 mg, 5 mg, Intravenous, Q6H PRN, Valeri Diana AGACNP-BC    simethicone chewable tablet 80 mg, 1 tablet, Oral, QID PRN, Valeri Diana AGACNP-BC  VTE Risk Mitigation (From admission, onward)           Ordered     IP VTE HIGH RISK PATIENT  Once         05/10/24 2344     Place sequential compression device  Until discontinued         05/10/24 2344     Reason for No Pharmacological VTE Prophylaxis  Once        Question:   Reasons:  Answer:  IV Heparin w/in 24 hrs. Pre or Post-Op    05/10/24 2341                  Assessment:   CLI    - CTA abdomen/pelvis and BLE 5.10.24: occlusion of most of Left femoral artery with reconstitution from profunda collateral vessels at the distal femoral artery into the popliteal artery with diffuse narrowing of remaining distal L femoral and popliteal arteries. 3V runoff to mid calf with suboptimal opacification of L AT artery and PT artery at the level of the ankle. RLE revealing contrast opacifiation of R AT, PT and peroneal arteries up to the mid calf with no opacification seen below that level.     - Peripheral angiogram 05.11.24: Distal aorta small, calcified, RCI 30%, LCI 60% calcified, 35 mmHg gradient across Left iliac lesion. Right SFA, profunda, popliteal patent, 3 vessel runoff, small vessels, 90% calcified distal PT. LEFT % reduced to 0%, pop patent, 2 vessel runoff via PT, peroneal, distal AT occlusion   Right groin hematoma - Resolved     - RLE Arterial US (5.12.24): No evidence of pseudoaneurysm or localized hematoma in the right groin. Patent right common through mid femoral arteries.  COPD  HLD    -   PTSD  DORIAN    - improving  Hx sarcomatoid carcinoma of left lung  Tobacco dependence  Hx bone spur removal left foot    Plan:   Right groin US reviewed. No evidence of pseudoaneurysm or localized hematoma. H&H stable today.   Continue aspirin, plavix, and statin  Iliac stenosis is moderate.  If wound is slow to heal, can consider intervention to left AT and iliac as outpatient.    Patient should have follow up with podiatry or wound care as outpatient  Pain management per primary  Smoking cessation  Replete K & Mg.   F/u with her primary cardiologist, Dr. Robertson. She states that it would be too hard to travel back to [place] for f/u.    Will be available as needed.     Sherie Adams, LINDA  Cardiology  [facility] - [facility] Telemetry  05/13/2024

## 2024-05-14 ENCOUNTER — PATIENT OUTREACH (OUTPATIENT)
Dept: ADMINISTRATIVE | Facility: CLINIC | Age: 74
End: 2024-05-14
Payer: MEDICARE

## 2024-05-14 LAB — VIT B12 SERPL-MCNC: 156 NG/L (ref 180–914)

## 2024-05-14 NOTE — PROGRESS NOTES
C3 nurse spoke with Zita Read  for a TCC post hospital discharge follow up call. The patient reports does not have a scheduled HOSFU appointment. C3 nurse was unable to schedule HOSFU appointment [facility]. Patient advised to contact their PCP to schedule a HOSPFU within 5-7 days.

## 2024-05-16 LAB — METHYLMALONATE SERPL-SCNC: 0.37 NMOL/ML

## 2024-06-18 NOTE — PHYSICIAN QUERY
"Please clarify the    Hematoma      as it relates to the    Angiogram                   To respond, click "New Note"  "

## 2024-06-18 NOTE — PHYSICIAN QUERY
"Provider, due to the conflicting clinical picture, please clinically validate the diagnosis of       DORIAN      .   If validated, please provide additional clinical support for the diagnosis.   The condition is confirmed. Please specify clinical support (signs, symptoms, and treatment) for the confirmed diagnosis: creatinine change qualify for dorian      To respond, click "New Note"  "

## 2024-06-18 NOTE — PHYSICIAN QUERY
"Please clarify the nature/etiology of the patient's hematological values:   Acute blood loss anemia  Iron deficiency anemia              To respond, click "New Note"  "

## 2024-06-20 NOTE — PHYSICIAN QUERY
"Please clarify the    Hematoma      as it relates to the    Angiogram         It was likely a complication of the procedure if it happened within 24 hous.    To respond, click "New Note"  "

## (undated) DEVICE — SHEATH INTRODUCER 5FR 10CM

## (undated) DEVICE — KIT HAND CONTROL HIGH PRESSUR

## (undated) DEVICE — SYS WASTE FLD DISPOSAL 1400ML

## (undated) DEVICE — Device

## (undated) DEVICE — CATH TURBO ELIT 2.0

## (undated) DEVICE — DEVICE SPIDER FX 6MM 320CM

## (undated) DEVICE — CATH PV .018

## (undated) DEVICE — KIT MANIFOLD LOW PRESS TUBING

## (undated) DEVICE — GUIDEWIRE GLADIUS STR 300CM

## (undated) DEVICE — GUIDEWIRE TAPR STIFF ANG 260CM

## (undated) DEVICE — CANNULA ADULT NASAL 7FT

## (undated) DEVICE — SHEATH DESTINATION 6F X 45CM

## (undated) DEVICE — CATH QUICK-CROSS .035X135

## (undated) DEVICE — SET MICROPUNCTUREECHO STIFF

## (undated) DEVICE — WIRE GUIDE INQWIRE STR 180CM

## (undated) DEVICE — DEVICE BASIXCOMPAK INFL 20ML

## (undated) DEVICE — CATH ANGIO OMNI W/10SH 5F .035

## (undated) DEVICE — CATH ULTRAVERSE 018 6X22X130